# Patient Record
Sex: MALE | Race: BLACK OR AFRICAN AMERICAN | Employment: UNEMPLOYED | URBAN - METROPOLITAN AREA
[De-identification: names, ages, dates, MRNs, and addresses within clinical notes are randomized per-mention and may not be internally consistent; named-entity substitution may affect disease eponyms.]

---

## 2017-03-18 ENCOUNTER — HOSPITAL ENCOUNTER (EMERGENCY)
Facility: CLINIC | Age: 52
Discharge: HOME | End: 2017-03-18
Attending: EMERGENCY MEDICINE
Payer: COMMERCIAL

## 2017-03-18 VITALS
OXYGEN SATURATION: 98 % | BODY MASS INDEX: 28.51 KG/M2 | HEIGHT: 69 IN | HEART RATE: 83 BPM | SYSTOLIC BLOOD PRESSURE: 156 MMHG | TEMPERATURE: 98.1 F | WEIGHT: 192.5 LBS | DIASTOLIC BLOOD PRESSURE: 99 MMHG | RESPIRATION RATE: 16 BRPM

## 2017-03-18 DIAGNOSIS — J06.9 UPPER RESPIRATORY TRACT INFECTION, UNSPECIFIED TYPE: Primary | ICD-10-CM

## 2017-03-18 PROCEDURE — 99282 EMERGENCY DEPT VISIT SF MDM: CPT

## 2017-03-18 RX ORDER — BENZONATATE 100 MG/1
100 CAPSULE ORAL 3 TIMES DAILY PRN
Qty: 30 CAPSULE | Refills: 0 | Status: SHIPPED | OUTPATIENT
Start: 2017-03-18 | End: 2017-03-25

## 2017-08-21 ENCOUNTER — APPOINTMENT (OUTPATIENT)
Dept: GENERAL RADIOLOGY | Age: 52
End: 2017-08-21
Payer: COMMERCIAL

## 2017-08-21 ENCOUNTER — HOSPITAL ENCOUNTER (EMERGENCY)
Age: 52
Discharge: HOME | End: 2017-08-21
Attending: EMERGENCY MEDICINE
Payer: COMMERCIAL

## 2017-08-21 VITALS
DIASTOLIC BLOOD PRESSURE: 101 MMHG | TEMPERATURE: 98.6 F | SYSTOLIC BLOOD PRESSURE: 179 MMHG | RESPIRATION RATE: 18 BRPM | HEIGHT: 69 IN | WEIGHT: 202 LBS | OXYGEN SATURATION: 98 % | BODY MASS INDEX: 29.92 KG/M2 | HEART RATE: 79 BPM

## 2017-08-21 DIAGNOSIS — M54.9 MUSCULOSKELETAL BACK PAIN: ICD-10-CM

## 2017-08-21 DIAGNOSIS — I10 ESSENTIAL HYPERTENSION: Primary | ICD-10-CM

## 2017-08-21 LAB
ABSOLUTE EOS #: 0 K/UL (ref 0–0.4)
ABSOLUTE LYMPH #: 2.06 K/UL (ref 1–4.8)
ABSOLUTE MONO #: 0.26 K/UL (ref 0.1–0.8)
ANION GAP SERPL CALCULATED.3IONS-SCNC: 15 MMOL/L (ref 9–17)
BASOPHILS # BLD: 0 %
BASOPHILS ABSOLUTE: 0 K/UL (ref 0–0.2)
BUN BLDV-MCNC: 17 MG/DL (ref 6–20)
BUN/CREAT BLD: NORMAL (ref 9–20)
CALCIUM SERPL-MCNC: 8.8 MG/DL (ref 8.6–10.4)
CHLORIDE BLD-SCNC: 100 MMOL/L (ref 98–107)
CO2: 21 MMOL/L (ref 20–31)
CREAT SERPL-MCNC: 0.94 MG/DL (ref 0.7–1.2)
D-DIMER QUANTITATIVE: 0.24 MG/L FEU
DIFFERENTIAL TYPE: ABNORMAL
EOSINOPHILS RELATIVE PERCENT: 0 %
GFR AFRICAN AMERICAN: >60 ML/MIN
GFR NON-AFRICAN AMERICAN: >60 ML/MIN
GFR SERPL CREATININE-BSD FRML MDRD: NORMAL ML/MIN/{1.73_M2}
GFR SERPL CREATININE-BSD FRML MDRD: NORMAL ML/MIN/{1.73_M2}
GLUCOSE BLD-MCNC: 98 MG/DL (ref 70–99)
HCT VFR BLD CALC: 41.4 % (ref 41–53)
HEMOGLOBIN: 13.6 G/DL (ref 13.5–17.5)
LYMPHOCYTES # BLD: 24 %
MCH RBC QN AUTO: 27 PG (ref 26–34)
MCHC RBC AUTO-ENTMCNC: 32.8 G/DL (ref 31–37)
MCV RBC AUTO: 82.2 FL (ref 80–100)
MONOCYTES # BLD: 3 %
MORPHOLOGY: ABNORMAL
PDW BLD-RTO: 17.7 % (ref 12.5–15.4)
PLATELET # BLD: 230 K/UL (ref 140–450)
PLATELET ESTIMATE: ABNORMAL
PMV BLD AUTO: 12 FL (ref 6–12)
POC TROPONIN I: 0.01 NG/ML (ref 0–0.1)
POC TROPONIN INTERP: NORMAL
POTASSIUM SERPL-SCNC: 4 MMOL/L (ref 3.7–5.3)
RBC # BLD: 5.03 M/UL (ref 4.5–5.9)
RBC # BLD: ABNORMAL 10*6/UL
SEG NEUTROPHILS: 73 %
SEGMENTED NEUTROPHILS ABSOLUTE COUNT: 6.28 K/UL (ref 1.8–7.7)
SODIUM BLD-SCNC: 136 MMOL/L (ref 135–144)
TROPONIN INTERP: NORMAL
TROPONIN T: <0.03 NG/ML
WBC # BLD: 8.6 K/UL (ref 3.5–11)
WBC # BLD: ABNORMAL 10*3/UL

## 2017-08-21 PROCEDURE — 84484 ASSAY OF TROPONIN QUANT: CPT

## 2017-08-21 PROCEDURE — 6370000000 HC RX 637 (ALT 250 FOR IP): Performed by: STUDENT IN AN ORGANIZED HEALTH CARE EDUCATION/TRAINING PROGRAM

## 2017-08-21 PROCEDURE — 85025 COMPLETE CBC W/AUTO DIFF WBC: CPT

## 2017-08-21 PROCEDURE — 71020 XR CHEST STANDARD TWO VW: CPT

## 2017-08-21 PROCEDURE — 99284 EMERGENCY DEPT VISIT MOD MDM: CPT

## 2017-08-21 PROCEDURE — 80048 BASIC METABOLIC PNL TOTAL CA: CPT

## 2017-08-21 PROCEDURE — 72100 X-RAY EXAM L-S SPINE 2/3 VWS: CPT

## 2017-08-21 PROCEDURE — 85379 FIBRIN DEGRADATION QUANT: CPT

## 2017-08-21 PROCEDURE — 93005 ELECTROCARDIOGRAM TRACING: CPT

## 2017-08-21 RX ORDER — IBUPROFEN 400 MG/1
400 TABLET ORAL ONCE
Status: COMPLETED | OUTPATIENT
Start: 2017-08-21 | End: 2017-08-21

## 2017-08-21 RX ORDER — CYCLOBENZAPRINE HCL 10 MG
10 TABLET ORAL 3 TIMES DAILY PRN
Qty: 15 TABLET | Refills: 0 | Status: SHIPPED | OUTPATIENT
Start: 2017-08-21 | End: 2017-08-31

## 2017-08-21 RX ORDER — ACETAMINOPHEN 325 MG/1
650 TABLET ORAL EVERY 6 HOURS PRN
Qty: 30 TABLET | Refills: 0 | Status: SHIPPED | OUTPATIENT
Start: 2017-08-21 | End: 2018-09-02

## 2017-08-21 RX ORDER — HYDROCHLOROTHIAZIDE 12.5 MG/1
12.5 CAPSULE, GELATIN COATED ORAL DAILY
Qty: 30 CAPSULE | Refills: 3 | Status: SHIPPED | OUTPATIENT
Start: 2017-08-21 | End: 2019-01-06

## 2017-08-21 RX ADMIN — IBUPROFEN 400 MG: 400 TABLET ORAL at 20:46

## 2017-08-21 ASSESSMENT — ENCOUNTER SYMPTOMS
PHOTOPHOBIA: 0
NAUSEA: 0
COUGH: 0
VOMITING: 0
ABDOMINAL DISTENTION: 0
CHEST TIGHTNESS: 0
SHORTNESS OF BREATH: 0
ABDOMINAL PAIN: 0
EYE REDNESS: 0
BACK PAIN: 1

## 2017-08-21 ASSESSMENT — PAIN DESCRIPTION - DESCRIPTORS: DESCRIPTORS: SHARP;SHOOTING

## 2017-08-21 ASSESSMENT — PAIN DESCRIPTION - PAIN TYPE: TYPE: ACUTE PAIN

## 2017-08-21 ASSESSMENT — PAIN SCALES - GENERAL: PAINLEVEL_OUTOF10: 7

## 2017-08-21 ASSESSMENT — PAIN DESCRIPTION - ORIENTATION: ORIENTATION: LOWER

## 2017-08-21 ASSESSMENT — PAIN DESCRIPTION - LOCATION: LOCATION: BACK

## 2017-08-23 LAB
EKG ATRIAL RATE: 72 BPM
EKG P AXIS: 54 DEGREES
EKG P-R INTERVAL: 200 MS
EKG Q-T INTERVAL: 394 MS
EKG QRS DURATION: 100 MS
EKG QTC CALCULATION (BAZETT): 431 MS
EKG R AXIS: -3 DEGREES
EKG T AXIS: 30 DEGREES
EKG VENTRICULAR RATE: 72 BPM

## 2018-01-18 ENCOUNTER — HOSPITAL ENCOUNTER (EMERGENCY)
Facility: CLINIC | Age: 53
Discharge: HOME | End: 2018-01-18
Attending: EMERGENCY MEDICINE
Payer: COMMERCIAL

## 2018-01-18 ENCOUNTER — APPOINTMENT (OUTPATIENT)
Dept: GENERAL RADIOLOGY | Facility: CLINIC | Age: 53
End: 2018-01-18
Payer: COMMERCIAL

## 2018-01-18 VITALS
BODY MASS INDEX: 30.51 KG/M2 | SYSTOLIC BLOOD PRESSURE: 173 MMHG | WEIGHT: 206 LBS | HEIGHT: 69 IN | DIASTOLIC BLOOD PRESSURE: 101 MMHG | RESPIRATION RATE: 15 BRPM | OXYGEN SATURATION: 99 % | TEMPERATURE: 99.6 F | HEART RATE: 70 BPM

## 2018-01-18 DIAGNOSIS — J10.1 INFLUENZA A: Primary | ICD-10-CM

## 2018-01-18 LAB
DIRECT EXAM: ABNORMAL
Lab: ABNORMAL
SPECIMEN DESCRIPTION: ABNORMAL
STATUS: ABNORMAL

## 2018-01-18 PROCEDURE — 99283 EMERGENCY DEPT VISIT LOW MDM: CPT

## 2018-01-18 PROCEDURE — 87804 INFLUENZA ASSAY W/OPTIC: CPT

## 2018-01-18 PROCEDURE — 71046 X-RAY EXAM CHEST 2 VIEWS: CPT

## 2018-01-18 RX ORDER — ALBUTEROL SULFATE 90 UG/1
2 AEROSOL, METERED RESPIRATORY (INHALATION) EVERY 4 HOURS PRN
Qty: 1 INHALER | Refills: 0 | OUTPATIENT
Start: 2018-01-18 | End: 2019-01-06

## 2018-01-18 RX ORDER — BENZONATATE 100 MG/1
100 CAPSULE ORAL 3 TIMES DAILY PRN
Qty: 30 CAPSULE | Refills: 0 | Status: SHIPPED | OUTPATIENT
Start: 2018-01-18 | End: 2018-01-25

## 2018-01-18 RX ORDER — ALBUTEROL SULFATE 90 UG/1
2 AEROSOL, METERED RESPIRATORY (INHALATION) EVERY 4 HOURS PRN
Qty: 1 INHALER | Refills: 0 | Status: SHIPPED | OUTPATIENT
Start: 2018-01-18 | End: 2019-04-16 | Stop reason: ALTCHOICE

## 2018-01-18 RX ORDER — BENZONATATE 100 MG/1
100 CAPSULE ORAL 3 TIMES DAILY PRN
Qty: 30 CAPSULE | Refills: 0 | OUTPATIENT
Start: 2018-01-18 | End: 2018-01-18

## 2018-01-18 ASSESSMENT — ENCOUNTER SYMPTOMS
SINUS PRESSURE: 0
ABDOMINAL PAIN: 0
VOMITING: 0
COUGH: 1
RHINORRHEA: 1
SHORTNESS OF BREATH: 0
DIARRHEA: 0
EYES NEGATIVE: 1
SORE THROAT: 0
NAUSEA: 0

## 2018-01-19 NOTE — ED PROVIDER NOTES
Encounter   Medications    DISCONTD: benzonatate (TESSALON PERLES) 100 MG capsule     Sig: Take 1 capsule by mouth 3 times daily as needed for Cough     Dispense:  30 capsule     Refill:  0    albuterol sulfate HFA (PROVENTIL HFA) 108 (90 Base) MCG/ACT inhaler     Sig: Inhale 2 puffs into the lungs every 4 hours as needed for Wheezing or Shortness of Breath (Space out to every 6 hours as symptoms improve) Space out to every 6 hours as symptoms improve. Dispense:  1 Inhaler     Refill:  0    benzonatate (TESSALON PERLES) 100 MG capsule     Sig: Take 1 capsule by mouth 3 times daily as needed for Cough     Dispense:  30 capsule     Refill:  0    albuterol sulfate HFA (PROVENTIL HFA) 108 (90 Base) MCG/ACT inhaler     Sig: Inhale 2 puffs into the lungs every 4 hours as needed for Wheezing or Shortness of Breath (Space out to every 6 hours as symptoms improve) Space out to every 6 hours as symptoms improve. Dispense:  1 Inhaler     Refill:  0        Vitals:    Vitals:    01/18/18 1923 01/18/18 1925   BP:  (!) 181/113   Pulse: 73    Resp: 16    Temp: 99.6 °F (37.6 °C)    TempSrc: Temporal    SpO2: 99%    Weight: 93.4 kg (206 lb)    Height: 5' 9\" (1.753 m)      -------------------------  BP: (!) 181/113, Temp: 99.6 °F (37.6 °C), Pulse: 73, Resp: 16      Re-evaluation Notes    8:19 PM:   Patient is doing well on reevaluation. No acute changes. Influenza is positive. Chest x-ray negative for acute significant findings. Clinically he is nontoxic-appearing. I did recheck the blood pressure in the room and it is slightly lower but still elevated. Since he is otherwise asymptomatic I do not feel further workup in the emergency department is necessary for his hypertension that is otherwise a symptomatically. I feel his influenza may be causing it to be elevated somewhat as well. He is outside of the Tamiflu window.   I strongly advised and encouraged the patient to start taking his blood pressure medication

## 2018-09-02 ENCOUNTER — APPOINTMENT (OUTPATIENT)
Dept: GENERAL RADIOLOGY | Age: 53
End: 2018-09-02
Payer: COMMERCIAL

## 2018-09-02 ENCOUNTER — HOSPITAL ENCOUNTER (EMERGENCY)
Age: 53
Discharge: HOME | End: 2018-09-02
Attending: EMERGENCY MEDICINE
Payer: COMMERCIAL

## 2018-09-02 VITALS
SYSTOLIC BLOOD PRESSURE: 138 MMHG | WEIGHT: 200 LBS | DIASTOLIC BLOOD PRESSURE: 84 MMHG | OXYGEN SATURATION: 98 % | HEART RATE: 92 BPM | BODY MASS INDEX: 29.53 KG/M2 | TEMPERATURE: 97.7 F | RESPIRATION RATE: 18 BRPM

## 2018-09-02 DIAGNOSIS — M79.674 GREAT TOE PAIN, RIGHT: Primary | ICD-10-CM

## 2018-09-02 PROCEDURE — 99283 EMERGENCY DEPT VISIT LOW MDM: CPT

## 2018-09-02 PROCEDURE — 6370000000 HC RX 637 (ALT 250 FOR IP): Performed by: EMERGENCY MEDICINE

## 2018-09-02 PROCEDURE — 73630 X-RAY EXAM OF FOOT: CPT

## 2018-09-02 RX ORDER — ACETAMINOPHEN 325 MG/1
650 TABLET ORAL EVERY 8 HOURS PRN
Qty: 30 TABLET | Refills: 0 | Status: SHIPPED | OUTPATIENT
Start: 2018-09-02 | End: 2019-01-06

## 2018-09-02 RX ORDER — ACETAMINOPHEN 325 MG/1
650 TABLET ORAL ONCE
Status: COMPLETED | OUTPATIENT
Start: 2018-09-02 | End: 2018-09-02

## 2018-09-02 RX ORDER — IBUPROFEN 800 MG/1
800 TABLET ORAL ONCE
Status: COMPLETED | OUTPATIENT
Start: 2018-09-02 | End: 2018-09-02

## 2018-09-02 RX ORDER — IBUPROFEN 800 MG/1
800 TABLET ORAL EVERY 8 HOURS PRN
Qty: 30 TABLET | Refills: 0 | Status: SHIPPED | OUTPATIENT
Start: 2018-09-02 | End: 2019-01-06

## 2018-09-02 RX ADMIN — IBUPROFEN 800 MG: 800 TABLET ORAL at 21:59

## 2018-09-02 RX ADMIN — ACETAMINOPHEN 650 MG: 325 TABLET ORAL at 21:59

## 2018-09-02 ASSESSMENT — PAIN DESCRIPTION - LOCATION: LOCATION: TOE (COMMENT WHICH ONE)

## 2018-09-02 ASSESSMENT — PAIN SCALES - GENERAL
PAINLEVEL_OUTOF10: 10

## 2018-09-02 ASSESSMENT — PAIN DESCRIPTION - ORIENTATION: ORIENTATION: RIGHT

## 2018-09-03 NOTE — ED PROVIDER NOTES
[facility] [facility]  Emergency Department Encounter  Emergency Medicine Resident Note     Pt Name: Priscilla Garzon  MRN: [id number]  [place] 1965  Date of evaluation: 9/2/2018  PCP:  No primary care provider on file. CHIEF COMPLAINT       Chief Complaint   Patient presents with    Toe Pain       HISTORY OF PRESENT ILLNESS  (Location/Symptom, Timing/Onset, Context/Setting, Quality, Duration, Modifying Factors, Severity.)      Priscilla Garzon is a 52 y.o. male who presents with Right great toe pain. He states that he stepped out of the car and stepped weird. He states that he kind of rolled his ankle, but he does not have any ankle pain and instead he has toe pain feels like he may have stubbed it. Reports 8 out of 10 pain. States his family over the right toe. He has not taken anything for this as this happened right prior to arrival.  No numbness or weakness of the toe. PAST MEDICAL / SURGICAL / SOCIAL / FAMILY HISTORY     Past Medical History:  has a past medical history of Hypertension. Past Surgical History:  has a past surgical history that includes Ankle surgery. Allergies:  Patient has no known allergies. Home Meds:   Prior to Visit Medications    Medication Sig Taking? Authorizing Provider   ibuprofen (ADVIL;MOTRIN) 800 MG tablet Take 1 tablet by mouth every 8 hours as needed for Pain Yes Denis Draper MD   acetaminophen (TYLENOL) 325 MG tablet Take 2 tablets by mouth every 8 hours as needed for Pain or Fever Yes Denis Draper MD   albuterol sulfate HFA (PROVENTIL HFA) 108 (90 Base) MCG/ACT inhaler Inhale 2 puffs into the lungs every 4 hours as needed for Wheezing or Shortness of Breath (Space out to every 6 hours as symptoms improve) Space out to every 6 hours as symptoms improve.   Rick Monteiro,    albuterol sulfate HFA (PROVENTIL HFA) 108 (90 Base) MCG/ACT inhaler Inhale 2 puffs into the lungs every 4 hours as needed for Wheezing or Shortness of Breath

## 2019-01-06 ENCOUNTER — HOSPITAL ENCOUNTER (EMERGENCY)
Age: 54
Discharge: HOME | End: 2019-01-06
Attending: EMERGENCY MEDICINE
Payer: COMMERCIAL

## 2019-01-06 VITALS
HEIGHT: 69 IN | WEIGHT: 200 LBS | BODY MASS INDEX: 29.62 KG/M2 | TEMPERATURE: 98.2 F | SYSTOLIC BLOOD PRESSURE: 176 MMHG | HEART RATE: 65 BPM | DIASTOLIC BLOOD PRESSURE: 110 MMHG | RESPIRATION RATE: 16 BRPM | OXYGEN SATURATION: 100 %

## 2019-01-06 DIAGNOSIS — I10 ESSENTIAL HYPERTENSION: ICD-10-CM

## 2019-01-06 DIAGNOSIS — I15.9 SECONDARY HYPERTENSION: Primary | ICD-10-CM

## 2019-01-06 PROCEDURE — 6370000000 HC RX 637 (ALT 250 FOR IP): Performed by: PHYSICIAN ASSISTANT

## 2019-01-06 PROCEDURE — 99283 EMERGENCY DEPT VISIT LOW MDM: CPT

## 2019-01-06 RX ORDER — HYDROCHLOROTHIAZIDE 12.5 MG/1
12.5 CAPSULE, GELATIN COATED ORAL DAILY
Qty: 30 CAPSULE | Refills: 3 | Status: SHIPPED | OUTPATIENT
Start: 2019-01-06 | End: 2019-01-06

## 2019-01-06 RX ORDER — HYDROCHLOROTHIAZIDE 25 MG/1
12.5 TABLET ORAL ONCE
Status: COMPLETED | OUTPATIENT
Start: 2019-01-06 | End: 2019-01-06

## 2019-01-06 RX ORDER — HYDROCHLOROTHIAZIDE 12.5 MG/1
12.5 CAPSULE, GELATIN COATED ORAL DAILY
Qty: 30 CAPSULE | Refills: 0 | Status: SHIPPED | OUTPATIENT
Start: 2019-01-06 | End: 2021-04-22

## 2019-01-06 RX ADMIN — HYDROCHLOROTHIAZIDE 12.5 MG: 25 TABLET ORAL at 16:49

## 2019-01-06 ASSESSMENT — ENCOUNTER SYMPTOMS
COLOR CHANGE: 0
ABDOMINAL PAIN: 0
VOMITING: 0
BACK PAIN: 0
COUGH: 0
SORE THROAT: 0
DIARRHEA: 0
NAUSEA: 0
SHORTNESS OF BREATH: 0

## 2019-03-07 ENCOUNTER — OFFICE VISIT (OUTPATIENT)
Dept: PRIMARY CARE CLINIC | Age: 54
End: 2019-03-07
Payer: COMMERCIAL

## 2019-03-07 ENCOUNTER — HOSPITAL ENCOUNTER (OUTPATIENT)
Age: 54
Discharge: HOME | End: 2019-03-07
Payer: COMMERCIAL

## 2019-03-07 VITALS
HEART RATE: 80 BPM | DIASTOLIC BLOOD PRESSURE: 101 MMHG | TEMPERATURE: 97.9 F | OXYGEN SATURATION: 98 % | SYSTOLIC BLOOD PRESSURE: 171 MMHG | BODY MASS INDEX: 27.35 KG/M2 | WEIGHT: 185.2 LBS

## 2019-03-07 DIAGNOSIS — K64.9 HEMORRHOIDS, UNSPECIFIED HEMORRHOID TYPE: Primary | ICD-10-CM

## 2019-03-07 LAB
HCT VFR BLD CALC: 39.7 % (ref 40.7–50.3)
HEMOGLOBIN: 13.3 G/DL (ref 13–17)
MCH RBC QN AUTO: 27.8 PG (ref 25.2–33.5)
MCHC RBC AUTO-ENTMCNC: 33.5 G/DL (ref 28.4–34.8)
MCV RBC AUTO: 82.9 FL (ref 82.6–102.9)
NRBC AUTOMATED: 0 PER 100 WBC
PDW BLD-RTO: 16.4 % (ref 11.8–14.4)
PLATELET # BLD: ABNORMAL K/UL (ref 138–453)
PLATELET, FLUORESCENCE: 187 K/UL (ref 138–453)
PLATELET, IMMATURE FRACTION: 12.6 % (ref 1.1–10.3)
PMV BLD AUTO: ABNORMAL FL (ref 8.1–13.5)
RBC # BLD: 4.79 M/UL (ref 4.21–5.77)
WBC # BLD: 7.7 K/UL (ref 3.5–11.3)

## 2019-03-07 PROCEDURE — 85055 RETICULATED PLATELET ASSAY: CPT

## 2019-03-07 PROCEDURE — 99202 OFFICE O/P NEW SF 15 MIN: CPT | Performed by: INTERNAL MEDICINE

## 2019-03-07 PROCEDURE — 85027 COMPLETE CBC AUTOMATED: CPT

## 2019-03-07 PROCEDURE — 36415 COLL VENOUS BLD VENIPUNCTURE: CPT

## 2019-03-07 ASSESSMENT — PATIENT HEALTH QUESTIONNAIRE - PHQ9
1. LITTLE INTEREST OR PLEASURE IN DOING THINGS: 0
2. FEELING DOWN, DEPRESSED OR HOPELESS: 0
SUM OF ALL RESPONSES TO PHQ QUESTIONS 1-9: 0
SUM OF ALL RESPONSES TO PHQ QUESTIONS 1-9: 0
SUM OF ALL RESPONSES TO PHQ9 QUESTIONS 1 & 2: 0

## 2019-03-07 ASSESSMENT — ENCOUNTER SYMPTOMS: HEMATOCHEZIA: 1

## 2019-04-16 ENCOUNTER — OFFICE VISIT (OUTPATIENT)
Dept: INTERNAL MEDICINE | Age: 54
End: 2019-04-16
Payer: COMMERCIAL

## 2019-04-16 VITALS
HEART RATE: 80 BPM | WEIGHT: 188.6 LBS | DIASTOLIC BLOOD PRESSURE: 95 MMHG | HEIGHT: 69 IN | TEMPERATURE: 98.6 F | SYSTOLIC BLOOD PRESSURE: 160 MMHG | BODY MASS INDEX: 27.93 KG/M2

## 2019-04-16 DIAGNOSIS — Z13.220 SCREENING FOR LIPID DISORDERS: ICD-10-CM

## 2019-04-16 DIAGNOSIS — K64.4 BLEEDING EXTERNAL HEMORRHOIDS: Primary | ICD-10-CM

## 2019-04-16 DIAGNOSIS — R73.9 HYPERGLYCEMIA: ICD-10-CM

## 2019-04-16 DIAGNOSIS — I10 ESSENTIAL HYPERTENSION: ICD-10-CM

## 2019-04-16 DIAGNOSIS — Z12.11 COLON CANCER SCREENING: ICD-10-CM

## 2019-04-16 PROCEDURE — 99211 OFF/OP EST MAY X REQ PHY/QHP: CPT | Performed by: INTERNAL MEDICINE

## 2019-04-16 PROCEDURE — 99203 OFFICE O/P NEW LOW 30 MIN: CPT | Performed by: INTERNAL MEDICINE

## 2019-04-16 RX ORDER — LISINOPRIL AND HYDROCHLOROTHIAZIDE 12.5; 1 MG/1; MG/1
1 TABLET ORAL DAILY
Qty: 30 TABLET | Refills: 0 | Status: SHIPPED | OUTPATIENT
Start: 2019-04-16 | End: 2019-05-18 | Stop reason: SDUPTHER

## 2019-04-16 RX ORDER — HYDROCHLOROTHIAZIDE 12.5 MG/1
12.5 CAPSULE, GELATIN COATED ORAL DAILY
Qty: 30 CAPSULE | Refills: 0 | Status: CANCELLED | OUTPATIENT
Start: 2019-04-16

## 2019-04-16 NOTE — PATIENT INSTRUCTIONS
Return To Clinic 5/16/2019 . After Visit Summary  given and reviewed. It is very important for your care that you keep your appointment. If for some reason you are unable to keep your appointment it is equally important that you call our office at [phone] to cancel your appointment and reschedule. Failure to do so may result in your termination from our practice.     -Bloodwork orders given to patient, they will have them done before their next visit. --Dg Gr

## 2019-04-16 NOTE — PROGRESS NOTES
Visit   Medication Sig Dispense Refill    hydrocortisone (ANUSOL-HC) 2.5 % rectal cream Place rectally 2 times daily. 1 Tube 1    hydrochlorothiazide (MICROZIDE) 12.5 MG capsule Take 1 capsule by mouth daily 30 capsule 0    albuterol sulfate HFA (PROVENTIL HFA) 108 (90 Base) MCG/ACT inhaler Inhale 2 puffs into the lungs every 4 hours as needed for Wheezing or Shortness of Breath (Space out to every 6 hours as symptoms improve) Space out to every 6 hours as symptoms improve. 1 Inhaler 0     No current facility-administered medications on file prior to visit. FAMILY HISTORY:          Problem Relation Age of Onset    Other Mother         seizures    High Blood Pressure Mother     High Blood Pressure Father     High Blood Pressure Sister     High Blood Pressure Brother        REVIEW OF SYSTEMS:    Review of Systems   Constitutional: Negative for chills, fatigue and fever. HENT: Negative for congestion, hearing loss, rhinorrhea and sinus pain. Respiratory: Negative for cough, shortness of breath and wheezing. Cardiovascular: Negative for chest pain, palpitations and leg swelling. Gastrointestinal: Negative for abdominal pain, anal bleeding (resolved), blood in stool, constipation, diarrhea and nausea. Endocrine: Negative for polydipsia and polyuria. Genitourinary: Negative for dysuria, frequency and hematuria. Musculoskeletal: Positive for arthralgias and neck pain. Negative for back pain. Allergic/Immunologic: Negative for environmental allergies and immunocompromised state. Neurological: Negative for dizziness, seizures, syncope, weakness and headaches. Hematological: Negative for adenopathy. Does not bruise/bleed easily. Psychiatric/Behavioral: Negative for dysphoric mood. The patient is not nervous/anxious.           PHYSICAL EXAM:      Vitals:    04/16/19 1444 04/16/19 1459   BP: (!) 190/103 (!) 160/95   Site: Left Upper Arm Left Upper Arm   Position: Sitting Sitting   Cuff Size: Medium Adult Medium Adult   Pulse: 80    Temp: 98.6 °F (37 °C)    TempSrc: Oral    Weight: 188 lb 9.6 oz (85.5 kg)    Height: 5' 9.02\" (1.753 m)      Wt Readings from Last 3 Encounters:   04/16/19 188 lb 9.6 oz (85.5 kg)   03/07/19 185 lb 3.2 oz (84 kg)   01/06/19 200 lb (90.7 kg)     Body mass index is 27.84 kg/m². Physical Exam   Constitutional: He is oriented to person, place, and time. He appears well-developed and well-nourished. No distress. HENT:   Head: Normocephalic and atraumatic. Mouth/Throat: Oropharynx is clear and moist.   Eyes: Pupils are equal, round, and reactive to light. Conjunctivae and EOM are normal.   Neck: Normal range of motion. Neck supple. No thyromegaly present. Cardiovascular: Normal rate and regular rhythm. No murmur heard. Pulmonary/Chest: Effort normal and breath sounds normal. He has no wheezes. He has no rales. Abdominal: Soft. Bowel sounds are normal. There is no tenderness. Musculoskeletal: He exhibits no edema. Arms:  Lymphadenopathy:     He has no cervical adenopathy. Neurological: He is alert and oriented to person, place, and time. Skin: Skin is warm and dry. He is not diaphoretic. Nursing note and vitals reviewed. LABORATORY FINDINGS:    CBC:   Lab Results   Component Value Date    WBC 7.7 03/07/2019    HGB 13.3 03/07/2019    PLT See Reflexed IPF Result 03/07/2019     BMP:    Lab Results   Component Value Date     08/21/2017    K 4.0 08/21/2017     08/21/2017    CO2 21 08/21/2017    BUN 17 08/21/2017    CREATININE 0.94 08/21/2017    GLUCOSE 98 08/21/2017     Hemoglobin A1C: No results found for: LABA1C  Lipid profile: No results found for: CHOL, TRIG, HDL  No results found for: LDLCALC, LDLCHOLESTEROL, LDLDIRECT    Thyroid functions: No results found for: TSH   Hepatic functions: No results found for: ALT, AST, PROT, BILITOT, BILIDIR, LABALBU  ASSESSMENT AND PLAN:   1.  Essential hypertension  Discussed DASH diet  Start Lisinopril/HCTZ  Check BP op if possible  Side effects explained  Get labs done    - Basic Metabolic Panel; Future  - Hepatic Function Panel; Future  - TSH with Reflex; Future  - Urinalysis with Microscopic; Future  - lisinopril-hydrochlorothiazide (PRINZIDE;ZESTORETIC) 10-12.5 MG per tablet; Take 1 tablet by mouth daily  Dispense: 30 tablet; Refill: 0    2. Bleeding external hemorrhoids  Asymptomatic now  Avoid constipation  Fiber    - CBC With Auto Differential; Future    3. Colon cancer screening    - Hannah Monroe MD, Gastroenterology, [facility]    4. Screening for lipid disorders    - Lipid, Fasting; Future    5. Hyperglycemia    - Hemoglobin A1C; Future      INSTRUCTIONS:   Return in about 1 month (around 5/14/2019). 1. Yo lerma on the following healthy behaviors: nutrition, exercise, medication adherence and tobacco cessation    2. Reviewed prior labs and healthmaintenance. 3. Discussed use, benefit, and side effects of prescribed medications. Barriers tomedication compliance addressed. All patient questions answered. Pt voiced understanding.      4. Patient giveneducational materials - see patient instructions    Love Preston MD    4/16/2019, 3:03 PM

## 2019-04-17 ASSESSMENT — ENCOUNTER SYMPTOMS
DIARRHEA: 0
CONSTIPATION: 0
BACK PAIN: 0
WHEEZING: 0
RHINORRHEA: 0
SHORTNESS OF BREATH: 0
ANAL BLEEDING: 0
ABDOMINAL PAIN: 0
SINUS PAIN: 0
COUGH: 0
BLOOD IN STOOL: 0
NAUSEA: 0

## 2019-05-16 DIAGNOSIS — I10 ESSENTIAL HYPERTENSION: ICD-10-CM

## 2019-05-18 RX ORDER — LISINOPRIL AND HYDROCHLOROTHIAZIDE 12.5; 1 MG/1; MG/1
1 TABLET ORAL DAILY
Qty: 30 TABLET | Refills: 0 | Status: SHIPPED | OUTPATIENT
Start: 2019-05-18 | End: 2021-04-22 | Stop reason: SDUPTHER

## 2019-06-19 ENCOUNTER — TELEPHONE (OUTPATIENT)
Dept: GASTROENTEROLOGY | Age: 54
End: 2019-06-19

## 2019-07-26 ENCOUNTER — TELEPHONE (OUTPATIENT)
Dept: INTERNAL MEDICINE | Age: 54
End: 2019-07-26

## 2019-11-04 ENCOUNTER — TELEPHONE (OUTPATIENT)
Dept: INTERNAL MEDICINE | Age: 54
End: 2019-11-04

## 2019-12-03 ENCOUNTER — APPOINTMENT (OUTPATIENT)
Dept: GENERAL RADIOLOGY | Age: 54
End: 2019-12-03
Payer: COMMERCIAL

## 2019-12-03 ENCOUNTER — HOSPITAL ENCOUNTER (EMERGENCY)
Age: 54
Discharge: LEFT BEFORE DISCHARGE | End: 2019-12-03
Attending: EMERGENCY MEDICINE
Payer: COMMERCIAL

## 2019-12-03 VITALS
HEART RATE: 61 BPM | TEMPERATURE: 97.9 F | DIASTOLIC BLOOD PRESSURE: 91 MMHG | OXYGEN SATURATION: 100 % | BODY MASS INDEX: 27.92 KG/M2 | SYSTOLIC BLOOD PRESSURE: 156 MMHG | RESPIRATION RATE: 13 BRPM | WEIGHT: 195 LBS | HEIGHT: 70 IN

## 2019-12-03 DIAGNOSIS — Z53.29 LEFT AGAINST MEDICAL ADVICE: ICD-10-CM

## 2019-12-03 DIAGNOSIS — R07.9 CHEST PAIN, UNSPECIFIED TYPE: Primary | ICD-10-CM

## 2019-12-03 LAB
ABSOLUTE EOS #: 0.14 K/UL (ref 0–0.44)
ABSOLUTE IMMATURE GRANULOCYTE: 0.03 K/UL (ref 0–0.3)
ABSOLUTE LYMPH #: 2.7 K/UL (ref 1.1–3.7)
ABSOLUTE MONO #: 0.71 K/UL (ref 0.1–1.2)
ANION GAP SERPL CALCULATED.3IONS-SCNC: 18 MMOL/L (ref 9–17)
BASOPHILS # BLD: 1 % (ref 0–2)
BASOPHILS ABSOLUTE: 0.04 K/UL (ref 0–0.2)
BUN BLDV-MCNC: 15 MG/DL (ref 6–20)
BUN/CREAT BLD: ABNORMAL (ref 9–20)
CALCIUM SERPL-MCNC: 9 MG/DL (ref 8.6–10.4)
CHLORIDE BLD-SCNC: 106 MMOL/L (ref 98–107)
CO2: 17 MMOL/L (ref 20–31)
CREAT SERPL-MCNC: 0.88 MG/DL (ref 0.7–1.2)
DIFFERENTIAL TYPE: ABNORMAL
EOSINOPHILS RELATIVE PERCENT: 2 % (ref 1–4)
GFR AFRICAN AMERICAN: >60 ML/MIN
GFR NON-AFRICAN AMERICAN: >60 ML/MIN
GFR SERPL CREATININE-BSD FRML MDRD: ABNORMAL ML/MIN/{1.73_M2}
GFR SERPL CREATININE-BSD FRML MDRD: ABNORMAL ML/MIN/{1.73_M2}
GLUCOSE BLD-MCNC: 93 MG/DL (ref 70–99)
HCT VFR BLD CALC: 38.5 % (ref 40.7–50.3)
HEMOGLOBIN: 12.9 G/DL (ref 13–17)
IMMATURE GRANULOCYTES: 0 %
LYMPHOCYTES # BLD: 30 % (ref 24–43)
MCH RBC QN AUTO: 28.1 PG (ref 25.2–33.5)
MCHC RBC AUTO-ENTMCNC: 33.5 G/DL (ref 28.4–34.8)
MCV RBC AUTO: 83.9 FL (ref 82.6–102.9)
MONOCYTES # BLD: 8 % (ref 3–12)
NRBC AUTOMATED: 0 PER 100 WBC
PDW BLD-RTO: 16.2 % (ref 11.8–14.4)
PLATELET # BLD: ABNORMAL K/UL (ref 138–453)
PLATELET ESTIMATE: ABNORMAL
PLATELET, FLUORESCENCE: 226 K/UL (ref 138–453)
PLATELET, IMMATURE FRACTION: 8.2 % (ref 1.1–10.3)
PMV BLD AUTO: ABNORMAL FL (ref 8.1–13.5)
POTASSIUM SERPL-SCNC: 3.4 MMOL/L (ref 3.7–5.3)
RBC # BLD: 4.59 M/UL (ref 4.21–5.77)
RBC # BLD: ABNORMAL 10*6/UL
SEG NEUTROPHILS: 59 % (ref 36–65)
SEGMENTED NEUTROPHILS ABSOLUTE COUNT: 5.25 K/UL (ref 1.5–8.1)
SODIUM BLD-SCNC: 141 MMOL/L (ref 135–144)
TROPONIN INTERP: NORMAL
TROPONIN T: NORMAL NG/ML
TROPONIN, HIGH SENSITIVITY: 9 NG/L (ref 0–22)
WBC # BLD: 8.9 K/UL (ref 3.5–11.3)
WBC # BLD: ABNORMAL 10*3/UL

## 2019-12-03 PROCEDURE — 80048 BASIC METABOLIC PNL TOTAL CA: CPT

## 2019-12-03 PROCEDURE — 99285 EMERGENCY DEPT VISIT HI MDM: CPT

## 2019-12-03 PROCEDURE — 85055 RETICULATED PLATELET ASSAY: CPT

## 2019-12-03 PROCEDURE — 85025 COMPLETE CBC W/AUTO DIFF WBC: CPT

## 2019-12-03 PROCEDURE — 93005 ELECTROCARDIOGRAM TRACING: CPT | Performed by: STUDENT IN AN ORGANIZED HEALTH CARE EDUCATION/TRAINING PROGRAM

## 2019-12-03 PROCEDURE — 84484 ASSAY OF TROPONIN QUANT: CPT

## 2019-12-03 RX ORDER — ASPIRIN 81 MG/1
324 TABLET, CHEWABLE ORAL ONCE
Status: DISCONTINUED | OUTPATIENT
Start: 2019-12-03 | End: 2019-12-03 | Stop reason: HOSPADM

## 2019-12-03 ASSESSMENT — PAIN DESCRIPTION - ORIENTATION: ORIENTATION: LEFT

## 2019-12-03 ASSESSMENT — PAIN DESCRIPTION - LOCATION: LOCATION: CHEST

## 2019-12-03 ASSESSMENT — ENCOUNTER SYMPTOMS
SHORTNESS OF BREATH: 0
ABDOMINAL PAIN: 0

## 2019-12-03 ASSESSMENT — PAIN DESCRIPTION - DESCRIPTORS: DESCRIPTORS: TIGHTNESS;PINS AND NEEDLES

## 2019-12-03 ASSESSMENT — PAIN DESCRIPTION - PAIN TYPE: TYPE: ACUTE PAIN

## 2019-12-03 ASSESSMENT — PAIN SCALES - GENERAL: PAINLEVEL_OUTOF10: 8

## 2019-12-03 ASSESSMENT — PAIN DESCRIPTION - PROGRESSION: CLINICAL_PROGRESSION: NOT CHANGED

## 2019-12-03 ASSESSMENT — PAIN DESCRIPTION - FREQUENCY: FREQUENCY: CONTINUOUS

## 2019-12-04 LAB
EKG ATRIAL RATE: 64 BPM
EKG P AXIS: 53 DEGREES
EKG P-R INTERVAL: 192 MS
EKG Q-T INTERVAL: 462 MS
EKG QRS DURATION: 104 MS
EKG QTC CALCULATION (BAZETT): 476 MS
EKG R AXIS: -11 DEGREES
EKG T AXIS: 18 DEGREES
EKG VENTRICULAR RATE: 64 BPM

## 2019-12-04 PROCEDURE — 93010 ELECTROCARDIOGRAM REPORT: CPT | Performed by: INTERNAL MEDICINE

## 2020-01-13 ENCOUNTER — TELEPHONE (OUTPATIENT)
Dept: INTERNAL MEDICINE | Age: 55
End: 2020-01-13

## 2021-04-14 ENCOUNTER — HOSPITAL ENCOUNTER (EMERGENCY)
Age: 56
Discharge: HOME | End: 2021-04-14
Attending: EMERGENCY MEDICINE
Payer: MEDICAID

## 2021-04-14 VITALS
HEIGHT: 70 IN | DIASTOLIC BLOOD PRESSURE: 91 MMHG | HEART RATE: 71 BPM | WEIGHT: 180 LBS | OXYGEN SATURATION: 97 % | SYSTOLIC BLOOD PRESSURE: 163 MMHG | RESPIRATION RATE: 20 BRPM | TEMPERATURE: 98 F | BODY MASS INDEX: 25.77 KG/M2

## 2021-04-14 DIAGNOSIS — M54.31 RIGHT SCIATIC NERVE PAIN: Primary | ICD-10-CM

## 2021-04-14 PROCEDURE — 99283 EMERGENCY DEPT VISIT LOW MDM: CPT

## 2021-04-14 RX ORDER — CYCLOBENZAPRINE HCL 10 MG
10 TABLET ORAL 3 TIMES DAILY PRN
Qty: 10 TABLET | Refills: 0 | Status: SHIPPED | OUTPATIENT
Start: 2021-04-14 | End: 2021-04-22

## 2021-04-14 ASSESSMENT — ENCOUNTER SYMPTOMS
VOMITING: 0
RHINORRHEA: 0
CONSTIPATION: 0
NAUSEA: 0
BACK PAIN: 1
SORE THROAT: 0
DIARRHEA: 0
ABDOMINAL DISTENTION: 0
SHORTNESS OF BREATH: 0
WHEEZING: 0
COUGH: 0

## 2021-04-14 ASSESSMENT — PAIN DESCRIPTION - LOCATION: LOCATION: BUTTOCKS;LEG

## 2021-04-14 ASSESSMENT — PAIN DESCRIPTION - PAIN TYPE: TYPE: ACUTE PAIN

## 2021-04-14 ASSESSMENT — PAIN DESCRIPTION - DESCRIPTORS: DESCRIPTORS: SHOOTING;SHARP

## 2021-04-14 NOTE — ED NOTES
Patient arrived to ED alert and oriented x4  Patient has complaints of Lower back pain  Patient states that a few days ago he was pushing a cart and turned the wrong way causing back pain  Patient states that currently the pain is in his right buttocks and shoots down his right leg  Patient states that he took ibuprofen last night and it was non effective  Patient is able to bear weight and walk on leg       Patrick Morales RN  04/14/21 2252

## 2021-04-14 NOTE — ED PROVIDER NOTES
[facility] ED  Emergency Department        Pt Name: Salinas Julian  MRN: [id number]  [place] 1965  Date of evaluation: 4/14/21    CHIEF COMPLAINT       Chief Complaint   Patient presents with    Back Pain     Twisted wrong way the other day- Sharp shooting pain in right buttocks down leg       HISTORY OF PRESENT ILLNESS  (Location/Symptom, Timing/Onset, Context/Setting, Quality, Duration, ModifyingFactors, Severity.)      Salinas Julian is a 60 y.o. male who presents with pain to his right lower back with radiation down his right leg, does heavy lifting and twisting at work, and thinks he pulled something, no numbness of tingling, no weakness, no bowel or bladder incontinence. And no h/o IVDU. Took motrin without relief of pain. No abdominal pain, no nausea or vomiting, no fevers. PAST MEDICAL / SURGICAL / SOCIAL / FAMILY HISTORY      has a past medical history of Hypertension. has a past surgical history that includes Ankle surgery. Social History     Socioeconomic History    Marital status:      Spouse name: Not on file    Number of children: Not on file    Years of education: Not on file    Highest education level: Not on file   Occupational History    Not on file   Social Needs    Financial resource strain: Not on file    Food insecurity     Worry: Not on file     Inability: Not on file    Transportation needs     Medical: Not on file     Non-medical: Not on file   Tobacco Use    Smoking status: Current Every Day Smoker     Packs/day: 0.50     Years: 35.00     Pack years: 17.50     Types: Cigarettes    Smokeless tobacco: Never Used    Tobacco comment: pt states he is ready to quit.     Substance and Sexual Activity    Alcohol use: Not Currently     Frequency: Never     Comment: patient stopped drinking 1 year ago 4/14/21    Drug use: No    Sexual activity: Yes     Partners: Female   Lifestyle    Physical activity     Days per week: Not on file continue with tylenol, and motrin, and follow up with pcp. No pulsatile mass, no abdominal pain, and given work history low concern for AAA. No neuro deficits on exam, and stable gait. PLAN (LABS / IMAGING / EKG):  No orders of the defined types were placed in this encounter. MEDICATIONS ORDERED:  Orders Placed This Encounter   Medications    cyclobenzaprine (FLEXERIL) 10 MG tablet     Sig: Take 1 tablet by mouth 3 times daily as needed for Muscle spasms     Dispense:  10 tablet     Refill:  0       DIAGNOSTIC RESULTS / EMERGENCY DEPARTMENT COURSE / MDM     LABS:  No results found for this visit on 04/14/21. IMPRESSION: R sciatica        FINAL IMPRESSION      1.  Right sciatic nerve pain          DISPOSITION / PLAN     DISPOSITION Decision To Discharge 04/14/2021 08:19:15 AM      PATIENT REFERRED TO:  MD Lizzie Curtis [facility] [address] [facility] [address] [address]  [phone]    Schedule an appointment as soon as possible for a visit in 2 days        DISCHARGE MEDICATIONS:  New Prescriptions    CYCLOBENZAPRINE (FLEXERIL) 10 MG TABLET    Take 1 tablet by mouth 3 times daily as needed for Muscle spasms       Cyndie Braswell  8:23 AM    Attending Emergency Physician  [address] ED    (Please note that portions of this note were completed with a voice recognition program.  Effortswere made to edit the dictations but occasionally words are mis-transcribed.)              Liam Mendosa DO  04/14/21 [id number]

## 2021-04-16 ENCOUNTER — TELEPHONE (OUTPATIENT)
Dept: INTERNAL MEDICINE | Age: 56
End: 2021-04-16

## 2021-04-16 NOTE — TELEPHONE ENCOUNTER
Pt is scheduled with the PCR on 4/22 for an ED f/u for sciatica. Pt is in pain and can hardly get out of bed and is asking for anything sooner. Please call to discuss further.

## 2021-04-17 ENCOUNTER — HOSPITAL ENCOUNTER (EMERGENCY)
Age: 56
Discharge: HOME | End: 2021-04-18
Attending: EMERGENCY MEDICINE
Payer: MEDICAID

## 2021-04-17 VITALS
RESPIRATION RATE: 18 BRPM | DIASTOLIC BLOOD PRESSURE: 97 MMHG | SYSTOLIC BLOOD PRESSURE: 168 MMHG | OXYGEN SATURATION: 100 % | TEMPERATURE: 97.2 F | HEART RATE: 87 BPM

## 2021-04-17 DIAGNOSIS — M54.41 ACUTE RIGHT-SIDED LOW BACK PAIN WITH RIGHT-SIDED SCIATICA: ICD-10-CM

## 2021-04-17 DIAGNOSIS — M54.31 SCIATICA OF RIGHT SIDE: Primary | ICD-10-CM

## 2021-04-17 PROCEDURE — 99283 EMERGENCY DEPT VISIT LOW MDM: CPT

## 2021-04-17 RX ORDER — METHOCARBAMOL 500 MG/1
750 TABLET, FILM COATED ORAL ONCE
Status: COMPLETED | OUTPATIENT
Start: 2021-04-18 | End: 2021-04-18

## 2021-04-17 RX ORDER — IBUPROFEN 800 MG/1
800 TABLET ORAL ONCE
Status: COMPLETED | OUTPATIENT
Start: 2021-04-18 | End: 2021-04-18

## 2021-04-17 RX ORDER — LIDOCAINE 4 G/G
1 PATCH TOPICAL DAILY
Status: DISCONTINUED | OUTPATIENT
Start: 2021-04-18 | End: 2021-04-18 | Stop reason: HOSPADM

## 2021-04-17 RX ORDER — ACETAMINOPHEN 500 MG
1000 TABLET ORAL ONCE
Status: COMPLETED | OUTPATIENT
Start: 2021-04-18 | End: 2021-04-18

## 2021-04-17 ASSESSMENT — PAIN DESCRIPTION - PAIN TYPE: TYPE: CHRONIC PAIN

## 2021-04-17 ASSESSMENT — PAIN DESCRIPTION - ORIENTATION: ORIENTATION: LOWER

## 2021-04-18 PROCEDURE — 6370000000 HC RX 637 (ALT 250 FOR IP): Performed by: STUDENT IN AN ORGANIZED HEALTH CARE EDUCATION/TRAINING PROGRAM

## 2021-04-18 RX ORDER — METHOCARBAMOL 750 MG/1
750 TABLET, FILM COATED ORAL 3 TIMES DAILY PRN
Qty: 10 TABLET | Refills: 0 | Status: SHIPPED | OUTPATIENT
Start: 2021-04-18 | End: 2021-04-22 | Stop reason: SDUPTHER

## 2021-04-18 RX ORDER — LIDOCAINE 4 G/G
1 PATCH TOPICAL DAILY
Qty: 2 BOX | Refills: 0 | Status: SHIPPED | OUTPATIENT
Start: 2021-04-18 | End: 2021-05-18

## 2021-04-18 RX ORDER — METHOCARBAMOL 750 MG/1
750 TABLET, FILM COATED ORAL 3 TIMES DAILY PRN
Qty: 30 TABLET | Refills: 0 | Status: SHIPPED | OUTPATIENT
Start: 2021-04-18 | End: 2021-04-18 | Stop reason: SDUPTHER

## 2021-04-18 RX ADMIN — METHOCARBAMOL 750 MG: 500 TABLET ORAL at 00:26

## 2021-04-18 RX ADMIN — IBUPROFEN 800 MG: 800 TABLET, FILM COATED ORAL at 00:25

## 2021-04-18 RX ADMIN — ACETAMINOPHEN 1000 MG: 500 TABLET ORAL at 00:24

## 2021-04-18 ASSESSMENT — ENCOUNTER SYMPTOMS
NAUSEA: 0
RHINORRHEA: 0
SHORTNESS OF BREATH: 0
BACK PAIN: 1
VOMITING: 0
ABDOMINAL PAIN: 0

## 2021-04-18 ASSESSMENT — PAIN SCALES - GENERAL: PAINLEVEL_OUTOF10: 10

## 2021-04-18 NOTE — ED PROVIDER NOTES
[address]  ED  Emergency Department Encounter  Emergency Medicine Resident     Pt Name: Rehan Gardiner  MRN: [id number]  [place] 1965  Date of evaluation: 4/17/21  PCP:  No primary care provider on file. CHIEF COMPLAINT       Chief Complaint   Patient presents with    Back Pain     Pt c/o back pain radiating down rt leg, seen here Wed for same       HISTORY OFPRESENT ILLNESS  (Location/Symptom, Timing/Onset, Context/Setting, Quality, Duration, Modifying Factors,Severity.)      Rehan Gardiner is a 60 y.o. male who presents with right lower back pain. Patient was seen for this last week in the department. He states the pain worsens with movement and radiates down his right leg wrapping around to the front towards the knee. No associated weakness or numbness. No new trauma or injury. 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 No known injury specific to the onset of this pain. He denies any recent illness. No fevers, chills, abdominal pain, nausea, vomiting, other complaints. She has been taking Flexeril at home with little relief. PAST MEDICAL / SURGICAL / SOCIAL / FAMILY HISTORY      has a past medical history of Hypertension. has a past surgical history that includes Ankle surgery.      Social History     Socioeconomic History    Marital status:      Spouse name: Not on file    Number of children: Not on file    Years of education: Not on file    Highest education level: Not on file   Occupational History    Not on file   Social Needs    Financial resource strain: Not on file    Food insecurity     Worry: Not on file     Inability: Not on file    Transportation needs     Medical: Not on file     Non-medical: Not on file   Tobacco Use    Smoking status: Current Every Day Smoker     Packs/day: 0.50     Years: 35.00     Pack years: 17.50     Types: Cigarettes    Smokeless tobacco: Never Used    Tobacco comment: pt states he is ready to quit. Substance and Sexual Activity    Alcohol use: Not Currently     Frequency: Never     Comment: patient stopped drinking 1 year ago 4/14/21    Drug use: No    Sexual activity: Yes     Partners: Female   Lifestyle    Physical activity     Days per week: Not on file     Minutes per session: Not on file    Stress: Not on file   Relationships    Social connections     Talks on phone: Not on file     Gets together: Not on file     Attends Hindu service: Not on file     Active member of club or organization: Not on file     Attends meetings of clubs or organizations: Not on file     Relationship status: Not on file    Intimate partner violence     Fear of current or ex partner: Not on file     Emotionally abused: Not on file     Physically abused: Not on file     Forced sexual activity: Not on file   Other Topics Concern    Not on file   Social History Narrative    Not on file       Family History   Problem Relation Age of Onset    Other Mother         seizures    High Blood Pressure Mother     High Blood Pressure Father     High Blood Pressure Sister     High Blood Pressure Brother         Allergies:  Patient has no known allergies. Home Medications:  Prior to Admission medications    Medication Sig Start Date End Date Taking?  Authorizing Provider   lidocaine 4 % external patch Place 1 patch onto the skin daily 4/18/21 5/18/21 Yes Fabiola Alexandre, DO   methocarbamol (ROBAXIN-750) 750 MG tablet Take 1 tablet by mouth 3 times daily as needed (for back pain) Do not take if driving or operating machinery as this can make you drowsy 4/18/21  Yes Janell Casillas, DO   cyclobenzaprine (FLEXERIL) 10 MG tablet Take 1 tablet by mouth 3 times daily as needed for Muscle spasms 4/14/21 4/24/21  Sabina Woodson, DO   lisinopril-hydrochlorothiazide (PRINZIDE;ZESTORETIC) 10-12.5 MG per tablet Take 1 tablet by mouth daily 5/18/19   Kelley Patel MD hydrocortisone (ANUSOL-HC) 2.5 % rectal cream Place rectally 2 times daily. 3/7/19   Florence Coyle MD   hydrochlorothiazide (MICROZIDE) 12.5 MG capsule Take 1 capsule by mouth daily 1/6/19   Abhilash Lizarraga PA-C       REVIEW OFSYSTEMS    (2-9 systems for level 4, 10 or more for level 5)      Review of Systems   Constitutional: Negative for chills and fever. HENT: Negative for congestion and rhinorrhea. Eyes: Negative for visual disturbance. Respiratory: Negative for shortness of breath. Cardiovascular: Negative for chest pain. Gastrointestinal: Negative for abdominal pain, nausea and vomiting. Musculoskeletal: Positive for back pain and myalgias. Skin: Negative for rash and wound. Neurological: Negative for dizziness, weakness, numbness and headaches. PHYSICAL EXAM   (up to 7 for level 4, 8 or more forlevel 5)      INITIAL VITALS:   ED Triage Vitals [04/17/21 2054]   BP Temp Temp Source Pulse Resp SpO2 Height Weight   (!) 173/97 97.2 °F (36.2 °C) Tympanic 87 18 100 % -- --       Physical Exam  Constitutional:       General: He is not in acute distress. Appearance: Normal appearance. He is not ill-appearing, toxic-appearing or diaphoretic. HENT:      Head: Normocephalic and atraumatic. Eyes:      Extraocular Movements: Extraocular movements intact. Neck:      Musculoskeletal: Normal range of motion and neck supple. Cardiovascular:      Rate and Rhythm: Normal rate and regular rhythm. Heart sounds: Normal heart sounds. No murmur. Pulmonary:      Effort: Pulmonary effort is normal. No respiratory distress. Breath sounds: Normal breath sounds. No wheezing or rhonchi. Abdominal:      Palpations: Abdomen is soft. Tenderness: There is no abdominal tenderness. Musculoskeletal: Normal range of motion. Comments: Mild tenderness to palpation the low right paraspinal and upper gluteal muscles. Flexion at the hip makes pain worse. No overlying bruising or rash. Skin:     General: Skin is warm and dry. Neurological:      General: No focal deficit present. Mental Status: He is alert and oriented to person, place, and time. Sensory: No sensory deficit. Motor: No weakness. Psychiatric:         Mood and Affect: Mood normal.         DIFFERENTIAL  DIAGNOSIS     PLAN (LABS / IMAGING / EKG):  No orders of the defined types were placed in this encounter. MEDICATIONS ORDERED:  Orders Placed This Encounter   Medications    acetaminophen (TYLENOL) tablet 1,000 mg    ibuprofen (ADVIL;MOTRIN) tablet 800 mg    methocarbamol (ROBAXIN) tablet 750 mg    DISCONTD: lidocaine 4 % external patch 1 patch    lidocaine 4 % external patch     Sig: Place 1 patch onto the skin daily     Dispense:  2 box     Refill:  0    DISCONTD: methocarbamol (ROBAXIN-750) 750 MG tablet     Sig: Take 1 tablet by mouth 3 times daily as needed (for back pain) Do not take if driving or operating machinery as this can make you drowsy     Dispense:  30 tablet     Refill:  0    methocarbamol (ROBAXIN-750) 750 MG tablet     Sig: Take 1 tablet by mouth 3 times daily as needed (for back pain) Do not take if driving or operating machinery as this can make you drowsy     Dispense:  10 tablet     Refill:  0       Initial MDM/Plan/ED COURSE:    60 y.o. male who presents with right lower back pain radiating into his right leg. Patient was seen here last week for this and he used Flexeril with little relief. On exam, patient is in no acute distress. Vitals are stable, blood pressure slightly elevated at 168/97. He is otherwise afebrile and remainder vitals are within normal limits. On exam, patient has minimal tenderness palpation at the right lower paraspinal and right upper gluteal muscles. Flexion of the hip makes the pain worse. Neuro exam is normal, he has good strength in the lower extremities that is metric. No sensation deficit. No overlying rash or wounds.   Discussed with patient that this is likely sciatica and will take some time to recover with the use of stretches and conservative treatment. He does have follow-up with his PCP this week and I encouraged him to talk about physical therapy or other ways to treat sciatica. While here, we will give him Tylenol, ibuprofen, lidocaine patch, and try different muscle relaxant with Robaxin. Patient discharged with prescriptions for lidocaine patches and Robaxin. He has a follow-up with his PCP this week. Again urged him to discuss further management of this.      :     DIAGNOSTIC RESULTS / EMERGENCYDEPARTMENT COURSE / MDM     LABS:  Labs Reviewed - No data to display        No results found. EKG  Not indicated    All EKG's are interpreted by the Emergency Department Physicianwho either signs or Co-signs this chart in the absence of a cardiologist.      PROCEDURES:  None    CONSULTS:  None    CRITICAL CARE:  Please see attending note    FINAL IMPRESSION      1. Sciatica of right side    2. Acute right-sided low back pain with right-sided sciatica          DISPOSITION / PLAN     DISPOSITION Decision To Discharge 04/18/2021 12:28:50 AM      PATIENT REFERRED TO:  No follow-up provider specified.     DISCHARGE MEDICATIONS:  Discharge Medication List as of 4/18/2021 12:34 AM      START taking these medications    Details   lidocaine 4 % external patch Place 1 patch onto the skin daily, Transdermal, DAILY Starting Sun 4/18/2021, Until Tue 5/18/2021, For 30 days, Disp-2 box, R-0, [facility],   Emergency Medicine Resident    (Please note that portions of this note were completed with a voice recognition program.Efforts were made to edit the dictations but occasionally words are mis-transcribed.)       Get Velazquez DO  Resident  04/18/21 1969

## 2021-04-20 NOTE — ED PROVIDER NOTES
[facility] ED  Emergency Department  Faculty Attestation       I performed a history and physical examination of the patient and discussed management with the resident. I reviewed the residents note and agree with the documented findings including all diagnostic interpretations and plan of care. Any areas of disagreement are noted on the chart. I was personally present for the key portions of any procedures. I have documented in the chart those procedures where I was not present during the key portions. I have reviewed the emergency nurses triage note. I agree with the chief complaint, past medical history, past surgical history, allergies, medications, social and family history as documented unless otherwise noted below. Documentation of the HPI, Physical Exam and Medical Decision Making performed by floridaibdidi is based on my personal performance of the HPI, PE and MDM. For Physician Assistant/ Nurse Practitioner cases/documentation I have personally evaluated this patient and have completed at least one if not all key elements of the E/M (history, physical exam, and MDM). Additional findings are as noted. Pertinent Comments     Primary Care Physician: No primary care provider on file. ED Triage Vitals [04/17/21 2054]   BP Temp Temp Source Pulse Resp SpO2 Height Weight   (!) 173/97 97.2 °F (36.2 °C) Tympanic 87 18 100 % -- --        History/Physical: This is a 60 y.o. male who presents to the Emergency Department with complaint of right-sided back pain. Was seen approximately 3 days prior for similar symptoms. Pain goes down the right leg, but there is no weakness or numbness. No bowel bladder incontinence. No fevers. Does lift heavy objects, denies any blunt trauma. On exam patient is sitting on the edge of the bed comfortably in no significant distress. Normocephalic atraumatic. Heart sounds regular lungs are auscultation. There is no midline thoracic or lumbar tenderness. With no step-off or deformity. There is tenderness over the right lower paraspinal region with some spasm. Does have some discomfort with movement of the right leg, but there is no weakness or loss of sensation in the extremities. Is able to ambulate without difficulty. MDM/Plan:   Back pain with right-sided sciatica. Patient ambulating without difficulty.   Likely musculoskeletal strain versus disc herniation  Flexeril not working at home  No red flag symptoms and no signs of cauda equina  Will do symptomatic treatment and switch the muscle relaxant to see if there is better relief  Follow-up outpatient, okay for discharge      CRITICAL CARE: None     Daniel Britt MD  Attending Emergency Physician        Daniel Britt MD  04/20/21 8546

## 2021-04-22 ENCOUNTER — OFFICE VISIT (OUTPATIENT)
Dept: INTERNAL MEDICINE | Age: 56
End: 2021-04-22
Payer: MEDICAID

## 2021-04-22 VITALS
BODY MASS INDEX: 24.45 KG/M2 | DIASTOLIC BLOOD PRESSURE: 98 MMHG | HEIGHT: 70 IN | SYSTOLIC BLOOD PRESSURE: 165 MMHG | HEART RATE: 74 BPM | WEIGHT: 170.8 LBS

## 2021-04-22 DIAGNOSIS — Z11.4 SCREENING FOR HIV (HUMAN IMMUNODEFICIENCY VIRUS): ICD-10-CM

## 2021-04-22 DIAGNOSIS — M54.16 RIGHT LUMBAR RADICULOPATHY: ICD-10-CM

## 2021-04-22 DIAGNOSIS — I10 ESSENTIAL HYPERTENSION: ICD-10-CM

## 2021-04-22 DIAGNOSIS — M54.31 SCIATICA OF RIGHT SIDE: Primary | ICD-10-CM

## 2021-04-22 DIAGNOSIS — Z00.00 HEALTH CARE MAINTENANCE: ICD-10-CM

## 2021-04-22 DIAGNOSIS — F17.200 TOBACCO USE DISORDER: ICD-10-CM

## 2021-04-22 DIAGNOSIS — Z12.11 SCREENING FOR COLON CANCER: ICD-10-CM

## 2021-04-22 DIAGNOSIS — Z11.59 ENCOUNTER FOR HEPATITIS C SCREENING TEST FOR LOW RISK PATIENT: ICD-10-CM

## 2021-04-22 PROCEDURE — 99213 OFFICE O/P EST LOW 20 MIN: CPT | Performed by: STUDENT IN AN ORGANIZED HEALTH CARE EDUCATION/TRAINING PROGRAM

## 2021-04-22 PROCEDURE — 3017F COLORECTAL CA SCREEN DOC REV: CPT | Performed by: STUDENT IN AN ORGANIZED HEALTH CARE EDUCATION/TRAINING PROGRAM

## 2021-04-22 PROCEDURE — 1111F DSCHRG MED/CURRENT MED MERGE: CPT | Performed by: STUDENT IN AN ORGANIZED HEALTH CARE EDUCATION/TRAINING PROGRAM

## 2021-04-22 PROCEDURE — 4004F PT TOBACCO SCREEN RCVD TLK: CPT | Performed by: STUDENT IN AN ORGANIZED HEALTH CARE EDUCATION/TRAINING PROGRAM

## 2021-04-22 PROCEDURE — G8427 DOCREV CUR MEDS BY ELIG CLIN: HCPCS | Performed by: STUDENT IN AN ORGANIZED HEALTH CARE EDUCATION/TRAINING PROGRAM

## 2021-04-22 PROCEDURE — G8420 CALC BMI NORM PARAMETERS: HCPCS | Performed by: STUDENT IN AN ORGANIZED HEALTH CARE EDUCATION/TRAINING PROGRAM

## 2021-04-22 RX ORDER — LISINOPRIL AND HYDROCHLOROTHIAZIDE 12.5; 1 MG/1; MG/1
1 TABLET ORAL DAILY
Qty: 30 TABLET | Refills: 1 | Status: SHIPPED | OUTPATIENT
Start: 2021-04-22 | End: 2021-06-19 | Stop reason: SDUPTHER

## 2021-04-22 RX ORDER — METHOCARBAMOL 750 MG/1
750 TABLET, FILM COATED ORAL 3 TIMES DAILY PRN
Qty: 30 TABLET | Refills: 0 | Status: SHIPPED | OUTPATIENT
Start: 2021-04-22 | End: 2021-07-30

## 2021-04-22 RX ORDER — B-COMPLEX WITH VITAMIN C
1 TABLET ORAL DAILY
Qty: 60 TABLET | Refills: 0 | Status: SHIPPED | OUTPATIENT
Start: 2021-04-22 | End: 2021-06-21

## 2021-04-22 RX ORDER — ZOSTER VACCINE RECOMBINANT, ADJUVANTED 50 MCG/0.5
0.5 KIT INTRAMUSCULAR SEE ADMIN INSTRUCTIONS
Qty: 0.5 ML | Refills: 0 | Status: SHIPPED | OUTPATIENT
Start: 2021-04-22 | End: 2021-10-19

## 2021-04-22 RX ORDER — TRAMADOL HYDROCHLORIDE 50 MG/1
50 TABLET ORAL EVERY 8 HOURS PRN
Qty: 20 TABLET | Refills: 0 | Status: SHIPPED | OUTPATIENT
Start: 2021-04-22 | End: 2021-05-19 | Stop reason: SDUPTHER

## 2021-04-22 RX ORDER — OXYCODONE HYDROCHLORIDE AND ACETAMINOPHEN 5; 325 MG/1; MG/1
1 TABLET ORAL EVERY 6 HOURS PRN
Qty: 15 TABLET | Refills: 0 | Status: CANCELLED | OUTPATIENT
Start: 2021-04-22 | End: 2021-04-29

## 2021-04-22 RX ORDER — CHOLECALCIFEROL (VITAMIN D3) 125 MCG
500 CAPSULE ORAL DAILY
Qty: 90 TABLET | Refills: 0 | Status: SHIPPED | OUTPATIENT
Start: 2021-04-22 | End: 2021-08-04 | Stop reason: ALTCHOICE

## 2021-04-22 SDOH — ECONOMIC STABILITY: TRANSPORTATION INSECURITY
IN THE PAST 12 MONTHS, HAS THE LACK OF TRANSPORTATION KEPT YOU FROM MEDICAL APPOINTMENTS OR FROM GETTING MEDICATIONS?: NO

## 2021-04-22 SDOH — ECONOMIC STABILITY: TRANSPORTATION INSECURITY
IN THE PAST 12 MONTHS, HAS LACK OF TRANSPORTATION KEPT YOU FROM MEETINGS, WORK, OR FROM GETTING THINGS NEEDED FOR DAILY LIVING?: NO

## 2021-04-22 SDOH — ECONOMIC STABILITY: INCOME INSECURITY: HOW HARD IS IT FOR YOU TO PAY FOR THE VERY BASICS LIKE FOOD, HOUSING, MEDICAL CARE, AND HEATING?: VERY HARD

## 2021-04-22 ASSESSMENT — PATIENT HEALTH QUESTIONNAIRE - PHQ9
1. LITTLE INTEREST OR PLEASURE IN DOING THINGS: 0
SUM OF ALL RESPONSES TO PHQ QUESTIONS 1-9: 0

## 2021-04-22 NOTE — PROGRESS NOTES
Post-Discharge Transitional Care Management Services or Hospital Follow Up      [address] [facility]   YOB: 1965    Date of Office Visit:  4/22/2021  Date of Hospital Admission: 4/17/21  Date of Hospital Discharge: 4/18/21  Risk of hospital readmission (high >=14%. Medium >=10%) :No data recorded    Care management risk score Rising risk (score 2-5) and Complex Care (Scores >=6): 1     Non face to face  following discharge, date last encounter closed (first attempt may have been earlier): *No documented post hospital discharge outreach found in the last 14 days    Call initiated 2 business days of discharge: *No response recorded in the last 14 days    Patient Active Problem List   Diagnosis    Neuropathy    Hypertension    Dyslipidemia    Tobacco use disorder    Obesity    Alcohol use       No Known Allergies    Medications listed as ordered at the time of discharge from hospital   [facility] [address] [place] [address] Medication Instructions [id number]    Printed on:04/22/21 2674   Medication Information                      cyclobenzaprine (FLEXERIL) 10 MG tablet  Take 1 tablet by mouth 3 times daily as needed for Muscle spasms             hydrochlorothiazide (MICROZIDE) 12.5 MG capsule  Take 1 capsule by mouth daily             hydrocortisone (ANUSOL-HC) 2.5 % rectal cream  Place rectally 2 times daily.              lidocaine 4 % external patch  Place 1 patch onto the skin daily             lisinopril-hydrochlorothiazide (PRINZIDE;ZESTORETIC) 10-12.5 MG per tablet  Take 1 tablet by mouth daily             methocarbamol (ROBAXIN-750) 750 MG tablet  Take 1 tablet by mouth 3 times daily as needed (for back pain) Do not take if driving or operating machinery as this can make you drowsy                   Medications marked \"taking\" at this time  Outpatient Medications Marked as Taking for the 4/22/21 encounter (Office Visit) with Jacqueline Quiros MD   Medication Sig Dispense Refill    lidocaine 4 % external patch Place 1 patch onto the skin daily 2 box 0        Medications patient taking as of now reconciled against medications ordered at time of hospital discharge: No    Chief Complaint   Patient presents with   [address] from [facility] ER 4/17 for back pain    Health Maintenance     Due for Hep C, HIV, Lipid, Shingrix, Colonoscopy, CMP (pending) Covid Vaccine pneumovax 23, Tdap       History of Present illness - Follow up of Hospital diagnosis(es):   Right-sided sciatica    Inpatient course: Discharge summary reviewed- see chart. Interval history/Current status:  Post discharge patient still has sharp shooting pain in the right buttock 10/10 in intensity radiating down to his right leg associated with numbness and tingling as well. Mentioned minimal weakness in right leg which gives away sometimes. Otherwise able to ambulate on his own without any issues. No recent trauma, URI, back pain or tenderness. Patient mentioned that her symptoms started 2 to 3 weeks ago when he was trying to pull some heavy objects at work. Evaluated in ER for same. No imaging done at that time. Previous lumbar x-ray done few days ago showed degenerative changes in L4-L5. He has history of essential hypertension. Has not taken his medications in a while. Denied any chest pain, pedal edema, orthopnea PND, headache or any other symptoms. Current blood pressure 167/103 heart rate 79. Blood pressure has been in systolic 558D the last 3 encounters. Current everyday smoker. 17-pack-year history of smoking present. Advised to quit. Health maintenance  Due for all vaccinations and colonoscopy.     A comprehensive review of systems was negative except for: Musculoskeletal: positive for right buttock pain  Neurological: positive for numbness and tingling on right leg    Vitals:    04/22/21 0929   BP: (!) 167/103   Pulse: 79   Weight: 170 lb 12.8 oz (77.5 kg)   Height: 5' 10\" (1.778 m)     Body mass index is 24.51 kg/m². Wt Readings from Last 3 Encounters:   04/22/21 170 lb 12.8 oz (77.5 kg)   04/14/21 180 lb (81.6 kg)   12/03/19 195 lb (88.5 kg)     BP Readings from Last 3 Encounters:   04/22/21 (!) 167/103   04/17/21 (!) 168/97   04/14/21 (!) 163/91        Physical Exam:  General Appearance: alert and oriented to person, place and time, well developed and well- nourished, in no acute distress  Skin: warm and dry, no rash or erythema  Neck supple and non-tender without mass, no thyromegaly or thyroid nodules, no cervical lymphadenopathy  Pulmonary/Chest: clear to auscultation bilaterally- no wheezes, rales or rhonchi, normal air movement, no respiratory distress  Cardiovascular: normal rate, regular rhythm, normal S1 and S2, no murmurs, rubs, clicks, or gallops, distal pulses intact, no carotid bruits  Abdomen: soft, non-tender, non-distended, normal bowel sounds, no masses or organomegaly  Extremities: no cyanosis, clubbing or edema  Musculoskeletal: Tenderness in right buttock(PSIS) normal range of motion, no joint swelling, deformity or tenderness  Neurologic: reflexes normal and symmetric, no cranial nerve deficit, gait, coordination and speech normal    Assessment/Plan:  1. Sciatica of right [id number] MRI lumbar spine. B12, Os-Khari.  Vitamin D supplementation. Advised exercises. Continue Robaxin 750 3 times daily, lidocaine patch. Toradol for 7 days. Will start Duloxetine if needed    2. Essential hypertension-uncontrolled due to noncompliance  Resumed lisinopril 10 and HCTZ 12.5 daily. Advised medication compliance. Discussed in details the cardiovascular risks of hypertension. 3. Rt Lumbar radiculopathy  Previous history of degenerative disease of L4-L5. Follow-up MRI lumbar spine. B12 supplementation. Os-Khari.  Vitamin D supplementation. 4. Tobacco use disorder  Advised for smoking cessation.   Advised cardiovascular risks of smoking        Medical Decision Making: moderate complexity    Summer Pierce MD      Department of Internal Medicine  [facility] [place]         4/22/2021, 9:58 AM

## 2021-04-22 NOTE — PROGRESS NOTES
Attending Physician Statement  I have discussed the care of China Kebede, including pertinent history and exam findings with the resident. I have reviewed the key elements of all parts of the encounter with the resident. I agree with the assessment, and status of the problem list as documented. Diagnosis Orders   1. Sciatica of right side  MRI LUMBAR SPINE W WO CONTRAST    vitamin B-12 (CYANOCOBALAMIN) 500 MCG tablet    Calcium Carbonate-Vitamin D (OYSTER SHELL CALCIUM/D) 500-200 MG-UNIT TABS    methocarbamol (ROBAXIN-750) 750 MG tablet    TX DISCHARGE MEDS RECONCILED W/ CURRENT OUTPATIENT MED LIST    traMADol (ULTRAM) 50 MG tablet   2. Essential hypertension  Comprehensive Metabolic Panel    lisinopril-hydroCHLOROthiazide (PRINZIDE;ZESTORETIC) 10-12.5 MG per tablet   3. Tobacco use disorder     4. Right lumbar radiculopathy     5. Health care maintenance  Lipid Panel    zoster recombinant adjuvanted vaccine [facility]) 50 MCG/0.5ML SUSR injection   6. Screening for colon cancer  [facility] Scre[facility] Colonoscopy   7. Encounter for hepatitis C screening test for low risk patient  Hepatitis C Antibody   8. Screening for HIV (human immunodeficiency virus)  HIV-1 And HIV-2 Antibodies      The plan and orders should include   Orders Placed This Encounter   Procedures    MRI LUMBAR SPINE W WO CONTRAST    Hepatitis C Antibody    HIV-1 And HIV-2 Antibodies    Comprehensive Metabolic Panel    Lipid Panel    [facility]    TX DISCHARGE MEDS RECONCILED W/ CURRENT OUTPATIENT MED LIST    and this was also documented by the resident.    Resume BP meds and rtc in 1-2 weeks    Dr Yoandy Puentes MD, [facility]  Associate , Department of Internal Medicine  Resident Ambulatory Site Medical Director  [address] [facility] Internal Medicine  [address] [facility]  Internal Medicine Clerkship - Mandie Bello    4/22/2021, 10:19 AM

## 2021-04-22 NOTE — LETTER
[address]  [address] [facility] [address]  Phone: [phone]  Fax: [phone]    Vidal Rae MD        April 22, 2021     Patient: Faith Martinez   YOB: 1965   Date of Visit: 4/22/2021       To Whom it May Concern:    Troy Brower was seen in my clinic on 4/22/2021. He may return to work on 4/23/2021. He is prohibited to pick/pull heavy weights because of lumbar disc disease along with Sciatica. If you have any questions or concerns, please don't hesitate to call.     Sincerely,         Vidal Rae MD

## 2021-04-22 NOTE — PATIENT INSTRUCTIONS
Patient Education        Sciatica: Care Instructions  Your Care Instructions     Sciatica (say \"uoj-LL-vt-kuh\") is an irritation of one of the sciatic nerves, which come from the spinal cord in the lower back. The sciatic nerves and their branches extend down through the buttock to the foot. Sciatica can develop when an injured disc in the back irritates or presses against a spinal nerve root. Its main symptom is pain, numbness, or weakness that is often worse in the leg or foot than in the back. Sciatica often will improve and go away with time. Early treatment usually includes medicines and exercises to relieve pain. Follow-up care is a key part of your treatment and safety. Be sure to make and go to all appointments, and call your doctor if you are having problems. It's also a good idea to know your test results and keep a list of the medicines you take. How can you care for yourself at home? · Take pain medicines exactly as directed. ? If the doctor gave you a prescription medicine for pain, take it as prescribed. ? If you are not taking a prescription pain medicine, ask your doctor if you can take an over-the-counter medicine. · Use heat or ice to relieve pain. ? To apply heat, put a warm water bottle, heating pad set on low, or warm cloth on your back. Do not go to sleep with a heating pad on your skin. ? To use ice, put ice or a cold pack on the area for 10 to 20 minutes at a time. Put a thin cloth between the ice and your skin. · Avoid sitting if possible, unless it feels better than standing. · Alternate lying down with short walks. Increase your walking distance as you are able to without making your symptoms worse. · Do not do anything that makes your symptoms worse. When should you call for help? Call 911 anytime you think you may need emergency care. For example, call if:    · You are unable to move a leg at all.    Call your doctor now or seek immediate medical care if:    · You have new or worse symptoms in your legs or buttocks. Symptoms may include:  ? Numbness or tingling. ? Weakness. ? Pain.     · You lose bladder or bowel control. Watch closely for changes in your health, and be sure to contact your doctor if:    · You are not getting better as expected. Where can you learn more? Go to https://chpemanjeetewtootie.GlySure. org and sign in to your goTenna account. Enter [phone] in the [facility] box to learn more about \"Sciatica: Care Instructions. \"     If you do not have an account, please click on the \"Sign Up Now\" link. Current as of: November 16, 2020               Content Version: 12.8  © 2006-2021 [email or web address]. Care instructions adapted under license by [facility] ([facility]). If you have questions about a medical condition or this instruction, always ask your healthcare professional. [place] [address] any warranty or liability for your use of this information. Medications e-scribe to pharmacy of pt's choice. Letter for work given to patient. Printed and signed script for Shingrix given to pt. Scripts for lab given to pt with fasting instructions, pt will get labs done before next appointment. Order for MRI of Lumbar Spine faxed to [facility] they will call pt for appt. Please call [phone] in not heard within 2 weeks. Referral for colonoscopy sent to [facility], they will call pt for appt. Copy of referral with address and number given to pt. Patient to return to clinic 2 weeks (5/7/21). AVS reviewed and given to pt. It is very important for your care that you keep your appointment. If for some reason you are unable to keep your appointment it is equally important that you call our office at [phone] to cancel your appointment and reschedule. Failure to do so may result in your termination from our practice.   MB

## 2021-05-13 ENCOUNTER — TELEPHONE (OUTPATIENT)
Dept: INTERNAL MEDICINE | Age: 56
End: 2021-05-13

## 2021-05-13 ENCOUNTER — HOSPITAL ENCOUNTER (OUTPATIENT)
Dept: MRI IMAGING | Age: 56
Discharge: HOME | End: 2021-05-15
Payer: MEDICAID

## 2021-05-13 DIAGNOSIS — M54.31 SCIATICA OF RIGHT SIDE: Primary | ICD-10-CM

## 2021-05-13 DIAGNOSIS — M54.31 SCIATICA OF RIGHT SIDE: ICD-10-CM

## 2021-05-13 PROCEDURE — 72148 MRI LUMBAR SPINE W/O DYE: CPT

## 2021-05-14 DIAGNOSIS — M48.062 SPINAL STENOSIS OF LUMBAR REGION WITH NEUROGENIC CLAUDICATION: Primary | ICD-10-CM

## 2021-05-18 ENCOUNTER — TELEPHONE (OUTPATIENT)
Dept: INTERNAL MEDICINE | Age: 56
End: 2021-05-18

## 2021-05-18 DIAGNOSIS — M54.31 SCIATICA OF RIGHT SIDE: ICD-10-CM

## 2021-05-18 NOTE — TELEPHONE ENCOUNTER
Tramadol refill      Last seen 04/22/2021     Next Visit Date:  No future appointments.     Health Maintenance   Topic Date Due    Hepatitis C screen  Never done    Pneumococcal 0-64 years Vaccine (1 of 2 - PPSV23) Never done    COVID-19 Vaccine (1) Never done    HIV screen  Never done    DTaP/Tdap/Td vaccine (1 - Tdap) Never done    Lipid screen  Never done    Shingles Vaccine (1 of 2) Never done    Colon cancer screen colonoscopy  Never done    Potassium monitoring  12/03/2020    Creatinine monitoring  12/03/2020    Flu vaccine (Season Ended) 09/01/2021    Hepatitis A vaccine  Aged Out    Hepatitis B vaccine  Aged Out    Hib vaccine  Aged Out    Meningococcal (ACWY) vaccine  Aged Out       No results found for: LABA1C          ( goal A1C is < 7)   No results found for: LABMICR  No results found for: LDLCHOLESTEROL, LDLCALC    (goal LDL is <100)   BUN (mg/dL)   Date Value   12/03/2019 15     BP Readings from Last 3 Encounters:   04/22/21 (!) 165/98   04/17/21 (!) 168/97   04/14/21 (!) 163/91          (goal 120/80)    All Future Testing planned in [facility]  Lab Frequency Next Occurrence   Hepatitis C Antibody Once 07/22/2021   HIV-1 And HIV-2 Antibodies Once 07/22/2021   Comprehensive Metabolic Panel Once 01/70/1770   Lipid Panel Once 07/22/2021               Patient Active Problem List:     Neuropathy     Hypertension     Dyslipidemia     Tobacco use disorder     Obesity     Alcohol use

## 2021-05-19 RX ORDER — TRAMADOL HYDROCHLORIDE 50 MG/1
50 TABLET ORAL EVERY 8 HOURS PRN
Qty: 20 TABLET | Refills: 0 | Status: SHIPPED | OUTPATIENT
Start: 2021-05-19 | End: 2021-05-26

## 2021-06-19 DIAGNOSIS — I10 ESSENTIAL HYPERTENSION: ICD-10-CM

## 2021-06-19 RX ORDER — LISINOPRIL AND HYDROCHLOROTHIAZIDE 12.5; 1 MG/1; MG/1
1 TABLET ORAL DAILY
Qty: 30 TABLET | Refills: 5 | Status: SHIPPED | OUTPATIENT
Start: 2021-06-19 | End: 2022-01-12 | Stop reason: SDUPTHER

## 2021-06-21 ENCOUNTER — TELEPHONE (OUTPATIENT)
Dept: INTERNAL MEDICINE | Age: 56
End: 2021-06-21

## 2021-06-21 NOTE — TELEPHONE ENCOUNTER
----- Message from Mariely Ibrahim sent at 6/21/2021  1:41 PM EDT -----  Subject: Refill Request    QUESTIONS  Name of Medication? lisinopril-hydroCHLOROthiazide (PRINZIDE;ZESTORETIC)   10-12.5 MG per tablet  Patient-reported dosage and instructions? 10-12.5MG/Once A Day  How many days do you have left? 0  Preferred Pharmacy? [address] [place] [address],[address] phone number (if available)? [phone]  Additional Information for Provider? patient is completely out of BP meds   needs refill asap   ---------------------------------------------------------------------------  --------------  CALL BACK INFO  What is the best way for the office to contact you? OK to leave message on   voicemail  Preferred Call Back Phone Number?  [phone]

## 2021-07-26 DIAGNOSIS — M54.31 SCIATICA OF RIGHT SIDE: ICD-10-CM

## 2021-07-30 ENCOUNTER — TELEPHONE (OUTPATIENT)
Dept: INTERNAL MEDICINE | Age: 56
End: 2021-07-30

## 2021-07-30 NOTE — LETTER
[address]  [address] [address]  Phone: [phone]  Fax: [phone]    Shayy Soriano MD        July 30, 2021    [address] [address]      Dear Rhona Vann: This letter is a reminder that you may have diagnostic testing that has not been completed. It is important to your well-being that these test(s) are performed. Please find the outstanding test(s) attached. If you could please have these completed before your next appointment. You can have the test completed at any [facility] facility or Lab. Please see the order for scheduling instructions. Any testing that needs completed other than blood work or xray's please call [phone] to schedule an appointment. Otherwise can be done at any [facility]. Please call our office at Dept: [phone] for additional information on the outstanding tests or let us know if they have been completed so we may update your chart. If you have any questions or concerns, please don't hesitate to call.     Sincerely,        Shayy Soriano MD

## 2021-07-30 NOTE — TELEPHONE ENCOUNTER
Request for Robaxin.     Multiple attempts to reach patient no answer, will send note to pharmacy last seen 4/22/2021    Next Visit Date:  Future Appointments   Date Time Provider Cassius Arevalo   8/4/2021 10:50 AM DO Alma Nielsen [facility] [address] Maintenance   Topic Date Due    Hepatitis C screen  Never done    Pneumococcal 0-64 years Vaccine (1 of 2 - PPSV23) Never done    COVID-19 Vaccine (1) Never done    HIV screen  Never done    DTaP/Tdap/Td vaccine (1 - Tdap) Never done    Lipid screen  Never done    Colon cancer screen colonoscopy  Never done    Shingles Vaccine (1 of 2) Never done    Potassium monitoring  12/03/2020    Creatinine monitoring  12/03/2020    Flu vaccine (1) 09/01/2021    Hepatitis A vaccine  Aged Out    Hepatitis B vaccine  Aged Out    Hib vaccine  Aged Out    Meningococcal (ACWY) vaccine  Aged Out       No results found for: LABA1C          ( goal A1C is < 7)   No results found for: LABMICR  No results found for: LDLCHOLESTEROL, LDLCALC    (goal LDL is <100)   BUN (mg/dL)   Date Value   12/03/2019 15     BP Readings from Last 3 Encounters:   04/22/21 (!) 165/98   04/17/21 (!) 168/97   04/14/21 (!) 163/91          (goal 120/80)    All Future Testing planned in [facility]  Lab Frequency Next Occurrence   Hepatitis C Antibody Once 07/22/2021   HIV-1 And HIV-2 Antibodies Once 07/22/2021   Comprehensive Metabolic Panel Once 08/32/9879   Lipid Panel Once 07/22/2021         Patient Active Problem List:     Neuropathy     Hypertension     Dyslipidemia     Tobacco use disorder     Obesity     Alcohol use

## 2021-08-03 RX ORDER — METHOCARBAMOL 750 MG/1
TABLET, FILM COATED ORAL
Qty: 30 TABLET | Refills: 0 | Status: SHIPPED | OUTPATIENT
Start: 2021-08-03 | End: 2021-08-04 | Stop reason: ALTCHOICE

## 2021-08-04 ENCOUNTER — HOSPITAL ENCOUNTER (OUTPATIENT)
Age: 56
Discharge: HOME | End: 2021-08-06
Payer: COMMERCIAL

## 2021-08-04 ENCOUNTER — HOSPITAL ENCOUNTER (OUTPATIENT)
Dept: GENERAL RADIOLOGY | Age: 56
Discharge: HOME | End: 2021-08-06
Payer: COMMERCIAL

## 2021-08-04 ENCOUNTER — OFFICE VISIT (OUTPATIENT)
Dept: NEUROSURGERY | Age: 56
End: 2021-08-04
Payer: COMMERCIAL

## 2021-08-04 VITALS
WEIGHT: 170 LBS | DIASTOLIC BLOOD PRESSURE: 85 MMHG | SYSTOLIC BLOOD PRESSURE: 151 MMHG | HEART RATE: 87 BPM | BODY MASS INDEX: 24.39 KG/M2 | RESPIRATION RATE: 16 BRPM

## 2021-08-04 DIAGNOSIS — M47.26 OTHER SPONDYLOSIS WITH RADICULOPATHY, LUMBAR REGION: ICD-10-CM

## 2021-08-04 DIAGNOSIS — M48.061 SPINAL STENOSIS OF LUMBAR REGION WITHOUT NEUROGENIC CLAUDICATION: ICD-10-CM

## 2021-08-04 DIAGNOSIS — M48.061 SPINAL STENOSIS OF LUMBAR REGION WITHOUT NEUROGENIC CLAUDICATION: Primary | ICD-10-CM

## 2021-08-04 PROCEDURE — G8427 DOCREV CUR MEDS BY ELIG CLIN: HCPCS | Performed by: NEUROLOGICAL SURGERY

## 2021-08-04 PROCEDURE — 72120 X-RAY BEND ONLY L-S SPINE: CPT

## 2021-08-04 PROCEDURE — G8420 CALC BMI NORM PARAMETERS: HCPCS | Performed by: NEUROLOGICAL SURGERY

## 2021-08-04 PROCEDURE — 4004F PT TOBACCO SCREEN RCVD TLK: CPT | Performed by: NEUROLOGICAL SURGERY

## 2021-08-04 PROCEDURE — 99204 OFFICE O/P NEW MOD 45 MIN: CPT | Performed by: NEUROLOGICAL SURGERY

## 2021-08-04 PROCEDURE — 3017F COLORECTAL CA SCREEN DOC REV: CPT | Performed by: NEUROLOGICAL SURGERY

## 2021-08-04 RX ORDER — MELOXICAM 15 MG/1
15 TABLET ORAL DAILY
Qty: 30 TABLET | Refills: 0 | Status: SHIPPED | OUTPATIENT
Start: 2021-08-04 | End: 2021-09-03

## 2021-08-04 NOTE — PROGRESS NOTES
Lydia  [address] [facility] [address] [facility] [address]  Dept: [phone]    Patient:  Leandra Gibbs  YOB: 1965  Date: 8/4/21    The patient is a 60 y.o. male who presents today for consult of the following problems:     Chief Complaint   Patient presents with    New Patient             HPI:     Leandra Gibbs is a 60 y.o. male on whom neurosurgical consultation was requested by Brent Sheffield MD for management of severe right-sided radiculopathy and axial back pain. Patient with approximately 7 to 8 months of severe axial back pain radiating down the right lower extremity approximate L5-S1 dermatomal distribution posterior aspect of the upper leg terminating approximately the popliteal fossa. Left leg symptoms distal numbness or tingling saddle anesthesia bowel or bladder dysfunction of any kind. Has been ambulatory overall. Has not had any physical therapy injections of any kind or conservative measures including anti-inflammatory pills with the exception of just a muscle relaxant. Patient states that his pain is approximate 6-7 out of 10 and exacerbated significantly when he is seated for long time or standing for long time and attempts to change positions. Currently not utilizing any home exercise or stretch regimen and does use heat intermittently but no creams or ice otherwise.       History:     Past Medical History:   Diagnosis Date    Hypertension      Past Surgical History:   Procedure Laterality Date    ANKLE SURGERY       Family History   Problem Relation Age of Onset    Other Mother         seizures    High Blood Pressure Mother     High Blood Pressure Father     High Blood Pressure Sister     High Blood Pressure Brother      Current Outpatient Medications on File Prior to Visit   Medication Sig Dispense Refill    lisinopril-hydroCHLOROthiazide (PRINZIDE;ZESTORETIC) 10-12.5 MG per tablet Take 1 tablet by mouth daily 30 tablet 5    zoster recombinant adjuvanted vaccine (SHINGRIX) 50 MCG/0.5ML SUSR injection Inject 0.5 mLs into the muscle See Admin Instructions 1 dose now and repeat in 2-6 months 0.5 mL 0    Calcium Carbonate-Vitamin D (OYSTER SHELL CALCIUM/D) 500-200 MG-UNIT TABS Take 1 tablet by mouth daily 60 tablet 0     No current facility-administered medications on file prior to visit. Social History     Tobacco Use    Smoking status: Current Every Day Smoker     Packs/day: 0.50     Years: 35.00     Pack years: 17.50     Types: Cigarettes    Smokeless tobacco: Never Used    Tobacco comment: pt states he is ready to quit. Vaping Use    Vaping Use: Never used   Substance Use Topics    Alcohol use: Not Currently     Comment: patient stopped drinking 1 year ago 4/14/21    Drug use: No       No Known Allergies    Review of Systems  Constitutional: Negative for activity change and appetite change. HENT: Negative for ear pain and facial swelling. Eyes: Negative for discharge and itching. Respiratory: Negative for choking and chest tightness. Cardiovascular: Negative for chest pain and leg swelling. Gastrointestinal: Negative for nausea and abdominal pain. Endocrine: Negative for cold intolerance and heat intolerance. Genitourinary: Negative for frequency and flank pain. Musculoskeletal: Negative for myalgias and joint swelling. Skin: Negative for rash and wound. Allergic/Immunologic: Negative for environmental allergies and food allergies. Hematological: Negative for adenopathy. Does not bruise/bleed easily. Psychiatric/Behavioral: Negative for self-injury. The patient is not nervous/anxious. Physical Exam:      BP (!) 153/86 (Site: Right Upper Arm, Position: Sitting, Cuff Size: Large Adult)   Pulse 89   Resp 16   Wt 170 lb (77.1 kg)   BMI 24.39 kg/m²   Estimated body mass index is 24.39 kg/m² as calculated from the following:    Height as of 4/22/21: 5' 10\" (1.778 m).     Weight as of this encounter: 170 lb (77.1 kg). General:  Graeme Martinez is a 60y.o. year old male who appears his stated age. HEENT: Normocephalic atraumatic. Neck supple. Chest: regular rate; pulses equal  Abdomen: Soft nontender nondistended. Normoactive bowel sounds. Ext: DP and PT pulses 2+, good cap refill  Neuro    Mentation  Appropriate affect  Registration intact  Orientation intact  3 item recall intact  Judgement intact to situation    Cranial Nerves:   Pupils equal and reactive to light  Extraocular motion intact  Face and shrug symmetric  Tongue midline  No dysarthria  v1-3 sensation symmetric, masseter tone symmetric  Hearing symmetric and intact to finger rub    Sensation:   Intact    Motor  L deltoid 5/5; R deltoid 5/5  L biceps 5/5; R biceps 5/5  L triceps 5/5; R triceps 5/5  L wrist extension 5/5; R wrist extension 5/5  L intrinsics 5/5; R intrinsics 5/5     L iliopsoas 5/5 , R iliopsoas 5/5  L quadriceps 5/5; R quadriceps 5/5  L Dorsiflexion 5/5; R dorsiflexion 5/5  L Plantarflexion 5/5; R plantarflexion 5/5  L EHL 5/5; R EHL 5/5    Reflexes  L Brachioradialis 2+/4; R brachioradialis 2+/4  L Biceps 2+/4; R Biceps 2+/4  L Triceps 2+/4; R Triceps 2+/4  L Patellar 2+/4: R Patellar 2+/4  L Achilles 2+/4; R Achilles 2+/4    hoffmans L: neg  hoffmans R: neg  Clonus L: neg  Clonus R: neg  Babinski L: up  Babinski R; up    Ellie Nim. Negative tenderness over the greater trochanters. Negative straight leg raise. Studies Review:     MRI lumbar spine with degenerative multilevel spondylosis with a protrusion central at L3-4 along with degenerative spondylosis involving the flavum and facets causing significant stenosis L3-4. Assessment and Plan:      1. Spinal stenosis of lumbar region without neurogenic claudication    2.  Other spondylosis with radiculopathy, lumbar region          Plan: Patient predominately has axial pain without any severe claudicate of symptoms including lack of lower extremity radiculopathy weakness bowel bladder dysfunction or saddle anesthesia. Considering that he has had no conservative exhaustive measures and has only axial pain at this time will strongly recommend multimodal conservative treatment. Will refer to physical therapy at this time for core strengthening stretches and postural training. In the event that he is refractory or develops worsening axial symptoms we can then consider both therapeutic and diagnostic facet blocks. We will also obtain flexion-extension x-rays to rule out any gross instability    Followup: No follow-ups on file. Prescriptions Ordered:  Orders Placed This Encounter   Medications    meloxicam (MOBIC) 15 MG tablet     Sig: Take 1 tablet by mouth daily     Dispense:  30 tablet     Refill:  0        Orders Placed:  Orders Placed This Encounter   Procedures    Ambulatory referral to Physical Therapy     Referral Priority:   Routine     Referral Type:   Eval and Treat     Referral Reason:   Specialty Services Required     Number of Visits Requested:   1        Electronically signed by Johann Thapa DO on 8/4/2021 at 11:21 AM    Please note that this chart was generated using voice recognition Dragon dictation software. Although every effort was made to ensure the accuracy of this automated transcription, some errors in transcription may have occurred.

## 2021-08-25 ENCOUNTER — HOSPITAL ENCOUNTER (OUTPATIENT)
Dept: PHYSICAL THERAPY | Age: 56
Setting detail: THERAPIES SERIES
Discharge: HOME | End: 2021-08-25
Payer: COMMERCIAL

## 2021-08-25 PROCEDURE — 97110 THERAPEUTIC EXERCISES: CPT

## 2021-08-25 PROCEDURE — 97161 PT EVAL LOW COMPLEX 20 MIN: CPT

## 2021-08-25 NOTE — CONSULTS
[x] [facility]) [facility]  [address]  [phone]  F: [phone] [] [address]  [facility]   [address]  P: [phone]  F: [phone] [] [address] [facility]  Therapy  [address]  P: [phone]  F: [phone] [] [address]  P: [phone]  F: [phone] [] [address]  [facility]   [address]   [place]: [phone]  F: [phone]      Physical Therapy Spine Evaluation    Date:  2021  Patient: Willy Chun  : 1965  MRN: [id number]  Physician: Sophia Tipton DO  Insurance: [facility]  Medical Diagnosis: Spinal stenosis of lumbar region without neurogenic claudication (M48.061 [ICD-10-CM]); Other spondylosis with radiculopathy, lumbar region (M47.26 [ICD-10-CM])    Rehab Codes: M54.5, M79.651, M25.60  Onset Date: 21  Next 's appt.:     Subjective:   CC: Intermittent back and right leg (thigh) pain; States that the pain occurs a lot when he is moving from sit to stand as well as moving to lying down; denies N&T currently, states he used to have some when symptoms first started months ago;   HPI: (onset date):  Pt reports insidious onset of back pain several months ago, states he woke up one morning with the pain in his lower back and right LE. States that he was basically bedridden for a couple of weeks due to pain but that he was finally able to get up and moving and symptoms have significantly improved.       PMHx: [] Unremarkable [] Diabetes [x] HTN  [] Pacemaker   [] MI/Heart Problems [] Cancer [] Arthritis [] Other:              [x] Refer to full medical chart  In EPIC       Comorbidities:   [] Obesity [] Dialysis  [] N/A   [] Asthma/COPD [] Dementia [] Other:   [] Stroke [] Sleep apnea [] Other:   [] Vascular disease [] Rheumatic disease [] Other:     Tests: [] X-Ray: [x] MRI: 4/22/21-  Multilevel degenerative disc disease and multilevel degenerative facet   hypertrophy with a superimposed midline disc extrusion at L3-4.  There is   canal stenosis at L3-4 and L4-5.       Severe narrowing of the L3-4 lateral recesses.       Bilateral foraminal narrowing as described above. [] Other:    Medications: [x] Refer to full medical record [] None [] Other:  Allergies:      [] Refer to full medical record [x] None [] Other:    Function:  Hand Dominance  [] Right  [] Left  Employer [employer]   Job Status [x]  Normal duty   [] Light duty   [] Off due to condition    []  Retired   [] Not employed   [] Disability  [] Other:  []  Return to work: Work activities/duties , graveyard shift, 7 days/week     Pain:  [x] Yes  [] No Location:  Pain Rating: (0-10 scale) 6/10 current, 7/10 at worst  Pain altered Tx:  [] Yes  [x] No  Action:    Symptoms:  [x] Improving [] Worsening [] Same  Better:  [] AM    [] PM    [] Sit    [] Rise/Sit    []Stand    [] Walk    [] Lying    [x] Other: stretching  Worse: [] AM    [] PM    [] Sit    [x] Rise/Sit    []Stand    [] Walk    [x] Lying    [] Bend                      [] Valsalva    [] Other:  Sleep: [x] OK    [] Disturbed    Objective:      STRENGTH  STRENGTH  ROM    Left Right  Left Right Cervical    C5 Shld Abd   L1-2 Hip Flex 5 5 Flexion    Shld Flexion   Hip Abd   Extension    Shld IR   L3-4 Knee Ext 5 5 Rotation L R   Shld ER   L4 Ankle DF 5 5 Sidebend L R   C6 Elb Flex   L5 EHL 5 5 Retraction    C7 Elb Ext   S1 Plant.  Flex 5 5 Lumbar    C8 EPL   Abdominals   Flexion 50% limited \"tight\"   T1 Fing Abd   Erector Spinae   Extension 25% limited         Rotation L 50% limited R 50% limited         Sidebend L 50% limited R 50% limited         UE/LE                                                              TESTS (+/-) LEFT RIGHT Not Tested   SLR [] sit [x] supine - - []   Hamstring (SLR) + + []   SKTC - + []   DKTC   []   Slump/Dural   []   SI JT   []   JONEL - - []   Joint Mobility   []   Cerv. Comp   []   Cerv. Distraction   []   Cerv. Alar/Transverse   []   Vertebral Artery   []   Adsons   []   Verle Dusky   []   Shantelle Tests ? Pain ? Pain No Change Not Tested   RFIS [] [] [] [x]   LYRIC [] [] [] [x]   RFIL [] [] [] [x]   REIL [] [] [] [x]   Rep Prot [] [] [] []   Rep Retract [] [] [] []       OBSERVATION No Deficit Deficit Not Tested Comments   Posture       Forward Head [] [x] []    Rounded Shoulders [] [x] []    Kyphosis [x] [] []    Lordosis [] [x] [] Decreased lumbar lordosis   Lateral Shift [x] [] []    Scoliosis [x] [] []    Iliac Crest [x] [] []    PSIS [x] [] []    ASIS [x] [] []    Leg Length Discrp [] [] [x]    Slumped Sitting [] [] []    Palpation [x] [] []    Sensation [x] [] []    Edema [x] [] []    Neurological [] [] []         Functional Test: Oswestry Score: 42% functionally impaired     Comments:    Assessment:  Patient would benefit from skilled physical therapy services in order to: address limited lumbar mobility, tightness of the hamstrings bilaterally, right hip tightness, postural impairments, core weakness, and functional impairments including difficulty moving from sit to stand. Problems:    [x] ? Pain:   [x] ? ROM:  [x] ? Strength:  [x] ? Function:  [] Other:      STG: (to be met in 8 treatments)  1. ? Pain: Decrease low back and right thigh pain to 4/10 at worst  2. ? ROM: Increase lumbar ROM to only limited by 25%   3. ? Strength: Increase core strength as demonstrated by progression of DLS exercises  4. ? Function: Improve Oswestry to 30% impairment  5. Patient to be independent with home exercise program as demonstrated by performance with correct form without cues. LTG: (to be met in 12 treatments)  1. Full, pain-free lumbar ROM  2. Pt will be able to move sit to stand without pain or difficulty  3.  Pt will be able to lift heavy weights off the floor   4. Improve Oswestry to 15% impairment      Patient goals: Pain relief. Rehab Potential:  [x] Good  [] Fair  [] Poor   Suggested Professional Referral:  [x] No  [] Yes:  Barriers to Goal Achievement:  [x] No  [] Yes:  Domestic Concerns:  [x] No  [] Yes:    Pt. Education:  [x] Plans/Goals, Risks/Benefits discussed  [x] Home exercise program--Discussed PT POC with the pt and issued handout of HEP with exercises listed below  Method of Education: [x] Verbal  [x] Demo  [x] Written  Comprehension of Education:  [x] Verbalizes understanding. [x] Demonstrates understanding. [] Needs Review. [] Demonstrates/verbalizes understanding of HEP/Ed previously given. Treatment Plan:  [x] Therapeutic Exercise   62881  [] Iontophoresis: 4 mg/mL Dexamethasone Sodium Phosphate  mAmin  29184   [] Therapeutic Activity  39953 [] Vasopneumatic cold with compression  15290    [] Gait Training   21091 [] Ultrasound   95719   [] Neuromuscular Re-education  60713 [x] Electrical Stimulation Unattended  20239   [x] Manual Therapy  75573 [] Electrical Stimulation Attended  23613   [x] Instruction in HEP  [] Lumbar/Cervical Traction  21229   [] Aquatic Therapy   47554 [x] Cold/hotpack    [] Massage   92401      [] Dry Needling, 1 or 2 muscles  21174   [] Biofeedback, first 15 minutes   88208  [] Biofeedback, additional 15 minutes   26414 [] Dry Needling, 3 or more muscles  32454     []  Medication allergies reviewed for use of    Dexamethasone Sodium Phosphate 4mg/ml     with iontophoresis treatments. Pt is not allergic.     Frequency:  2 x/week for 12 visits    Todays Treatment:  Modalities:   Precautions:  Exercises:  Exercise Reps/ Time Weight/ Level Comments   LTR 5xea 10\"    Sushil Ibarra 10\"    Iso abd draw-ins 10x                 Other:    Specific Instructions for next treatment: Per order-Core strengthening, stretches, postural training       Evaluation Complexity:  History (Personal factors, comorbidities) [x] 0 [] 1-2 [] 3+   Exam (limitations, restrictions) [x] 1-2 [] 3 [] 4+   Clinical presentation (progression) [x] Stable [] Evolving  [] Unstable   Decision Making [x] Low [] Moderate [] High    [x] Low Complexity [] Moderate Complexity [] High Complexity       Treatment Charges: Mins Units   [x] Evaluation       [x]  Low       []  Moderate       []  High 20 1   []  Modalities     [x]  Ther Exercise 10 1   []  Manual Therapy     []  Ther Activities     []  Aquatics     []  Vasocompression     []  Other       TOTAL TREATMENT TIME: 30    Time in: 1:10pm       Time out: 1:45pm    Electronically signed by: Mary Jo Ramos PT        Physician Signature:________________________________Date:__________________  By signing above or cosigning this note, I have reviewed this plan of care and certify a need for medically necessary rehabilitation services.      *PLEASE SIGN ABOVE AND FAX BACK ALL PAGES*

## 2021-12-26 DIAGNOSIS — I10 ESSENTIAL HYPERTENSION: ICD-10-CM

## 2021-12-27 DIAGNOSIS — I10 ESSENTIAL HYPERTENSION: ICD-10-CM

## 2021-12-27 RX ORDER — LISINOPRIL AND HYDROCHLOROTHIAZIDE 12.5; 1 MG/1; MG/1
1 TABLET ORAL DAILY
Qty: 30 TABLET | Refills: 5 | OUTPATIENT
Start: 2021-12-27 | End: 2022-02-25

## 2021-12-27 NOTE — TELEPHONE ENCOUNTER
----- Message from Abel Parekh sent at 12/27/2021  2:47 PM EST -----  Subject: Refill Request    QUESTIONS  Name of Medication? lisinopril-hydroCHLOROthiazide (PRINZIDE;ZESTORETIC)   10-12.5 MG per tablet  Patient-reported dosage and instructions? Take 1 tablet by mouth daily  How many days do you have left? 0  Preferred Pharmacy? [address] phone number (if available)? [phone]  ---------------------------------------------------------------------------  --------------  CALL BACK INFO  What is the best way for the office to contact you? OK to leave message on   voicemail  Preferred Call Back Phone Number?  [phone]

## 2021-12-28 RX ORDER — LISINOPRIL AND HYDROCHLOROTHIAZIDE 12.5; 1 MG/1; MG/1
TABLET ORAL
Qty: 30 TABLET | Refills: 0 | OUTPATIENT
Start: 2021-12-28

## 2021-12-28 NOTE — TELEPHONE ENCOUNTER
Request for Prinzide. Previous Dr. Juarez Hernandez pt but has an upcoming appt to establish with Dr. Myriam Soot on 1/12/22. The patient is requesting a refill for his medication to make it to this appointment.        Next Visit Date:  Future Appointments   Date Time Provider Cassius Arevalo   1/12/2022  1:00 PM Lis Kulkarni MD [address] [facility]   Topic Date Due    Hepatitis C screen  Never done    COVID-19 Vaccine (1) Never done    Pneumococcal 0-64 years Vaccine (1 of 2 - PPSV23) Never done    HIV screen  Never done    DTaP/Tdap/Td vaccine (1 - Tdap) Never done    Lipid screen  Never done    Colon cancer screen colonoscopy  Never done    Shingles Vaccine (1 of 2) Never done    Flu vaccine (1) Never done    Hepatitis A vaccine  Aged Out    Hepatitis B vaccine  Aged Out    Hib vaccine  Aged Out    Meningococcal (ACWY) vaccine  Aged Out       No results found for: LABA1C          ( goal A1C is < 7)   No results found for: LABMICR  No results found for: LDLCHOLESTEROL, LDLCALC    (goal LDL is <100)   BUN (mg/dL)   Date Value   12/03/2019 15     BP Readings from Last 3 Encounters:   08/04/21 (!) 151/85   04/22/21 (!) 165/98   04/17/21 (!) 168/97          (goal 120/80)    All Future Testing planned in [facility]  Lab Frequency Next Occurrence   Hepatitis C Antibody Once 02/08/2022   HIV-1 And HIV-2 Antibodies Once 02/08/2022   Comprehensive Metabolic Panel Once 70/66/9225   Lipid Panel Once 02/08/2022         Patient Active Problem List:     Neuropathy     Hypertension     Dyslipidemia     Tobacco use disorder     Obesity     Alcohol use

## 2022-01-12 ENCOUNTER — OFFICE VISIT (OUTPATIENT)
Dept: INTERNAL MEDICINE | Age: 57
End: 2022-01-12
Payer: COMMERCIAL

## 2022-01-12 VITALS
HEIGHT: 70 IN | HEART RATE: 73 BPM | DIASTOLIC BLOOD PRESSURE: 88 MMHG | TEMPERATURE: 98.1 F | BODY MASS INDEX: 27.92 KG/M2 | WEIGHT: 195 LBS | OXYGEN SATURATION: 97 % | SYSTOLIC BLOOD PRESSURE: 162 MMHG

## 2022-01-12 DIAGNOSIS — I10 ESSENTIAL HYPERTENSION: ICD-10-CM

## 2022-01-12 DIAGNOSIS — M53.86 SCIATICA ASSOCIATED WITH DISORDER OF LUMBAR SPINE: ICD-10-CM

## 2022-01-12 DIAGNOSIS — Z12.11 COLON CANCER SCREENING: ICD-10-CM

## 2022-01-12 PROCEDURE — 99213 OFFICE O/P EST LOW 20 MIN: CPT

## 2022-01-12 PROCEDURE — G8427 DOCREV CUR MEDS BY ELIG CLIN: HCPCS

## 2022-01-12 PROCEDURE — G8484 FLU IMMUNIZE NO ADMIN: HCPCS

## 2022-01-12 PROCEDURE — 3017F COLORECTAL CA SCREEN DOC REV: CPT

## 2022-01-12 PROCEDURE — 4004F PT TOBACCO SCREEN RCVD TLK: CPT

## 2022-01-12 PROCEDURE — G8419 CALC BMI OUT NRM PARAM NOF/U: HCPCS

## 2022-01-12 PROCEDURE — 99211 OFF/OP EST MAY X REQ PHY/QHP: CPT | Performed by: INTERNAL MEDICINE

## 2022-01-12 RX ORDER — LISINOPRIL AND HYDROCHLOROTHIAZIDE 12.5; 1 MG/1; MG/1
1 TABLET ORAL DAILY
Qty: 30 TABLET | Refills: 5 | Status: SHIPPED | OUTPATIENT
Start: 2022-01-12 | End: 2022-06-10 | Stop reason: SDUPTHER

## 2022-01-12 RX ORDER — TIZANIDINE 4 MG/1
4 TABLET ORAL EVERY 8 HOURS PRN
Qty: 30 TABLET | Refills: 1 | Status: SHIPPED | OUTPATIENT
Start: 2022-01-12 | End: 2022-04-06 | Stop reason: SDUPTHER

## 2022-01-12 RX ORDER — MEDICAL SUPPLY, MISCELLANEOUS
1 EACH MISCELLANEOUS DAILY
Qty: 1 EACH | Refills: 0 | Status: SHIPPED | OUTPATIENT
Start: 2022-01-12 | End: 2022-07-27

## 2022-01-12 RX ORDER — IBUPROFEN 600 MG/1
600 TABLET ORAL EVERY 8 HOURS PRN
Qty: 90 TABLET | Refills: 0 | Status: SHIPPED | OUTPATIENT
Start: 2022-01-12 | End: 2022-06-10 | Stop reason: SDUPTHER

## 2022-01-12 ASSESSMENT — ENCOUNTER SYMPTOMS
RHINORRHEA: 0
CHEST TIGHTNESS: 0
DIARRHEA: 0
BACK PAIN: 1
ABDOMINAL DISTENTION: 0
BLOOD IN STOOL: 0
COUGH: 0
WHEEZING: 0
SHORTNESS OF BREATH: 0
COLOR CHANGE: 0
APNEA: 0
CONSTIPATION: 0
ABDOMINAL PAIN: 0

## 2022-01-12 NOTE — PROGRESS NOTES
Attending Physician Statement  I have discussed the care of Nancy Walden,  including pertinent history and exam findings,  with the resident. I have seen and examined the patient and the key elements of all parts of the encounter have been performed by me. I agree with the assessment, plan and orders as documented by the resident. (GC Modifier)    Chronic back pain, sciatica and lumbar stenosis with intermittent neurogenic claudication : improved from last year.  He does not take any medication for this and states that none of the medications owrk for him     HTN : refilled his prinzide , monitor bp at home    Screening and preventive health check up was refused by the patient except for colonoscopy on account of altered bowel habits, night sweats and intermittent flare up of hemorrhoids  + active smoker , no weight loss           MD JOHN Eller  Attending Physician, [facility]   Faculty, Internal Medicine Residency Program  [address] [facility]  1/12/2022, 1:51 PM

## 2022-01-12 NOTE — PROGRESS NOTES
[facility]  [facility] [address] [address] [facility] [address]  Dept: [phone]  Dept Fax: [phone]    Office Progress/Follow Up Note  Date ofpatient's visit: 1/12/2022  Patient's Name:  Jossie Correa YOB: 1965            Patient Care Team:  Alvin Jones MD as PCP - General (Internal Medicine)  ================================================================    REASON FOR VISIT/CHIEF COMPLAINT:  Establish Care (HTN, needs refill ), Hemorrhoids, Back Pain, and Health Maintenance (declines vaccines, colonoscopy pended)    HISTORY OF PRESENTING ILLNESS:  History was obtained from: patient, electronic medical record. Zandra landry 60 y.o. is here for follow-up for his high blood pressure and medication refill. Patient has history of hypertension taking lisinopril HCTZ 10-12.5 daily but patient did not take his medication for the last 2 weeks because he ran out of it. His blood pressure today at today's visit is high, 162/88. Patient has history of lumbar radiculopathy taking meloxicam.  Currently complaining of back pain. He ran out of his pain meds. Patient reported diaphoresis (upper chest) overnight for the last 1 month but denies any chest pain, shortness of breath, weight loss, fevers headache,  cough, contact with sick people around him, abdominal pain, dysuria. He does has history of ID bleed with constipation several years ago. Denies any ID bleeding/pain now. Would like a referral for colonoscopy. Patient has smoking history, 1/2 PPD for the last 40 years. Denies low-dose chest CT for CT lung screening. Smoking cessation counseling done. Denies vaccination for COVID. Denies Hep C/HIV screen, HbA1c or lipid profile at this visit. We have talked to him in detail regarding the importance of screening for diabetes, early diagnosis and prevention of endorgan damage.   Also discussed with him importance of lipid screen and its impact on different organs. .     Diagnosis Orders   1. Essential hypertension  lisinopril-hydroCHLOROthiazide (PRINZIDE;ZESTORETIC) 10-12.5 MG per tablet    Basic Metabolic Panel   2. Sciatica associated with disorder of lumbar spine     3. Colon cancer screening  [facility] Scre[facility] Colonoscopy      Patient Active Problem List   Diagnosis    Colon cancer screening    Neuropathy    Hypertension    Dyslipidemia    Tobacco use disorder    Obesity    Alcohol use    Sciatica associated with disorder of lumbar spine       Health Maintenance Due   Topic Date Due    Hepatitis C screen  Never done    COVID-19 Vaccine (1) Never done    Pneumococcal 0-64 years Vaccine (1 of 2 - PPSV23) Never done    HIV screen  Never done    DTaP/Tdap/Td vaccine (1 - Tdap) Never done    Lipid screen  Never done    Colon cancer screen colonoscopy  Never done    Shingles Vaccine (1 of 2) Never done    Flu vaccine (1) Never done       No Known Allergies      Current Outpatient Medications   Medication Sig Dispense Refill    lisinopril-hydroCHLOROthiazide (PRINZIDE;ZESTORETIC) 10-12.5 MG per tablet Take 1 tablet by mouth daily 30 tablet 5    tiZANidine (ZANAFLEX) 4 MG tablet Take 1 tablet by mouth every 8 hours as needed (sciatica) 30 tablet 1    ibuprofen (ADVIL;MOTRIN) 600 MG tablet Take 1 tablet by mouth every 8 hours as needed for Pain 90 tablet 0    Blood Pressure Monitoring (B-D ASSURE BPM/AUTO ARM CUFF) MISC 1 each by Does not apply route daily 1 each 0     No current facility-administered medications for this visit. Social History     Tobacco Use    Smoking status: Current Every Day Smoker     Packs/day: 0.50     Years: 35.00     Pack years: 17.50     Types: Cigarettes    Smokeless tobacco: Never Used    Tobacco comment: pt states he is ready to quit.     Vaping Use    Vaping Use: Never used   Substance Use Topics    Alcohol use: Not Currently     Comment: patient stopped drinking 1 year ago 4/14/21    Drug use: No       Family History   Problem Relation Age of Onset    Other Mother         seizures    High Blood Pressure Mother     High Blood Pressure Father     High Blood Pressure Sister     High Blood Pressure Brother         REVIEW OF SYSTEMS:  Review of Systems   Constitutional: Negative for activity change, appetite change, diaphoresis, fatigue and fever. HENT: Negative for congestion, ear discharge, rhinorrhea and sneezing. Respiratory: Negative for apnea, cough, chest tightness, shortness of breath and wheezing. Cardiovascular: Negative for chest pain, palpitations and leg swelling. Gastrointestinal: Negative for abdominal distention, abdominal pain, blood in stool, constipation and diarrhea. Genitourinary: Negative for difficulty urinating, dysuria, enuresis, frequency and urgency. Musculoskeletal: Positive for back pain. Negative for gait problem, joint swelling, neck pain and neck stiffness. Skin: Negative for color change, pallor and rash. Neurological: Negative for dizziness, tremors, light-headedness, numbness and headaches. Psychiatric/Behavioral: Negative for agitation, confusion, self-injury and sleep disturbance. PHYSICAL EXAM:  Vitals:    01/12/22 1257 01/12/22 1302   BP: (!) 175/94 (!) 162/88   Site: Left Upper Arm Left Upper Arm   Position: Sitting Sitting   Cuff Size: Large Adult Large Adult   Pulse: 76 73   Temp: 98.1 °F (36.7 °C)    SpO2: 97%    Weight: 195 lb (88.5 kg)    Height: 5' 10\" (1.778 m)      BP Readings from Last 3 Encounters:   01/12/22 (!) 162/88   08/04/21 (!) 151/85   04/22/21 (!) 165/98        Physical Exam  Constitutional:       General: He is not in acute distress. Appearance: He is not ill-appearing or toxic-appearing. Cardiovascular:      Rate and Rhythm: Normal rate and regular rhythm. Heart sounds: No murmur heard. No friction rub. Pulmonary:      Effort: No respiratory distress. Breath sounds:  No wheezing, rhonchi or rales.   Abdominal:      General: There is no distension. Palpations: There is no mass. Tenderness: There is no abdominal tenderness. There is no guarding. Hernia: No hernia is present. Musculoskeletal:         General: No swelling or tenderness. Right lower leg: No edema. Left lower leg: No edema. Skin:     Coloration: Skin is not jaundiced or pale. Findings: No bruising or erythema. Neurological:      General: No focal deficit present. Mental Status: He is oriented to person, place, and time. Cranial Nerves: No cranial nerve deficit. Sensory: No sensory deficit. Motor: No weakness. Coordination: Coordination normal.           DIAGNOSTIC FINDINGS:  CBC:  Lab Results   Component Value Date    WBC 8.9 12/03/2019    HGB 12.9 12/03/2019    PLT See Reflexed IPF Result 12/03/2019       BMP:    Lab Results   Component Value Date     12/03/2019    K 3.4 12/03/2019     12/03/2019    CO2 17 12/03/2019    BUN 15 12/03/2019    CREATININE 0.88 12/03/2019    GLUCOSE 93 12/03/2019       HEMOGLOBIN A1C: No results found for: LABA1C    FASTING LIPID PANEL:No results found for: CHOL, HDL, TRIG    ASSESSMENT AND PLAN:  Maddy Handy was seen today for establish care, hemorrhoids, back pain and health maintenance. Diagnoses and all orders for this visit:    Essential hypertension  -     lisinopril-hydroCHLOROthiazide (PRINZIDE;ZESTORETIC) 10-12.5 MG per tablet; Take 1 tablet by mouth daily  -     Basic Metabolic Panel; Future    Sciatica associated with disorder of lumbar spine.  -Zanaflex 4q8 PRN  -Motrin 6q8 PRN  -Counseled regarding exercise      Colon cancer screening  -     [facility] Screening Colonoscopy    Other orders  -     tiZANidine (ZANAFLEX) 4 MG tablet; Take 1 tablet by mouth every 8 hours as needed (sciatica)  -     ibuprofen (ADVIL;MOTRIN) 600 MG tablet;  Take 1 tablet by mouth every 8 hours as needed for Pain         FOLLOW UP AND INSTRUCTIONS:  Return in about 3 months (around 4/12/2022). · Jeannie Rowland received counseling on the following healthy behaviors: exercise, medication adherence, tobacco cessation and decrease in alcohol consumption    · Discussed use, benefit, and side effects of prescribed medications. Barriers to medication compliance addressed. All patient questions answered. Pt voiced understanding. · Patient given educational materials - see patient instructions    Rodney Lorenzo MD  Resident Internal Medicine  [facility],  [facility]. 1/12/2022, 1:58 PM    This note is created with the assistance of a speech-recognition program. While intending to generate a document that actually reflects the content of thevisit, the document can still have some mistakes which may not have been identified and corrected by editing.

## 2022-01-12 NOTE — PATIENT INSTRUCTIONS
A printed script for Durable Medical Equipment was ordered for the patient. The script was faxed to [facility] MARY Francesca, [address] [place] [address]  P: [phone] F: [phone] . An order for Colonoscopy was placed by your physician. [facility] will be contacting you to schedule this procedure. Please contact [facility] at [phone] if you have not heard from scheduling within 1 week. Medications e-scribe to pharmacy of pt's choice. Laboratory Instructions: Your doctor has ordered blood or urine testing. You can get this testing done at the Lab located on the first floor of the [facility], or at any other [facility]. Please stop at Main Registration, before going to the lab, as you must be registered first.   Please get this lab done before your next visit. You may eat or drink before this test.  Labs given to patient    Patient was put on a wait list for 3 months and will be contacted to schedule their next follow up appointment once the schedule is available. If the patient is in need of an appointment before their next visit please call the office at [phone]. After Visit Summary  given and reviewed.

## 2022-01-13 ENCOUNTER — TELEPHONE (OUTPATIENT)
Dept: SURGERY | Age: 57
End: 2022-01-13

## 2022-01-13 NOTE — TELEPHONE ENCOUNTER
1/13/2022- Spoke to patient, surgery scheduled at [facility]. Patient confirmed and information mailed to patient.      Surgery date/time: 2/25/2022 at 11:00am  Arrival time: 9:30am  Prep appt: 2/15/22 at 3:30pm  Covid test appt: 2/21/2022 at 3:50pm

## 2022-02-07 ENCOUNTER — TELEPHONE (OUTPATIENT)
Dept: SURGERY | Age: 57
End: 2022-02-07

## 2022-02-07 RX ORDER — POLYETHYLENE GLYCOL 3350, SODIUM SULFATE ANHYDROUS, SODIUM BICARBONATE, SODIUM CHLORIDE, POTASSIUM CHLORIDE 236; 22.74; 6.74; 5.86; 2.97 G/4L; G/4L; G/4L; G/4L; G/4L
POWDER, FOR SOLUTION ORAL
Qty: 4000 ML | Refills: 0 | Status: SHIPPED | OUTPATIENT
Start: 2022-02-07 | End: 2022-02-15

## 2022-02-15 ENCOUNTER — TELEPHONE (OUTPATIENT)
Dept: INTERNAL MEDICINE | Age: 57
End: 2022-02-15

## 2022-02-15 RX ORDER — POLYETHYLENE GLYCOL 3350 17 G/17G
17 POWDER, FOR SOLUTION ORAL DAILY
Qty: 1530 G | Refills: 1 | Status: ON HOLD | OUTPATIENT
Start: 2022-02-15 | End: 2022-02-25 | Stop reason: HOSPADM

## 2022-02-15 NOTE — TELEPHONE ENCOUNTER
Pt. Just received a letter from his insurance company stating they don't cover ( Golytely ) Solution . Is there another medication for this.    Please advise

## 2022-02-16 ENCOUNTER — TELEPHONE (OUTPATIENT)
Dept: SURGERY | Age: 57
End: 2022-02-16

## 2022-02-16 NOTE — TELEPHONE ENCOUNTER
2/16/2022- Patient missed prep appt. LVM for patient to call back to review instructions. 2/16/2022- Spoke to patient, reviewed bowel prep and colonoscopy instructions. All questions answered and patient requested instructions be emailed to him. [email or web address]

## 2022-02-21 ENCOUNTER — TELEPHONE (OUTPATIENT)
Dept: FAMILY MEDICINE CLINIC | Age: 57
End: 2022-02-21

## 2022-02-21 ENCOUNTER — HOSPITAL ENCOUNTER (OUTPATIENT)
Dept: LAB | Age: 57
Setting detail: SPECIMEN
Discharge: HOME | End: 2022-02-21
Payer: COMMERCIAL

## 2022-02-21 DIAGNOSIS — Z01.818 PREOP TESTING: Primary | ICD-10-CM

## 2022-02-21 PROCEDURE — U0005 INFEC AGEN DETEC AMPLI PROBE: HCPCS

## 2022-02-21 PROCEDURE — U0003 INFECTIOUS AGENT DETECTION BY NUCLEIC ACID (DNA OR RNA); SEVERE ACUTE RESPIRATORY SYNDROME CORONAVIRUS 2 (SARS-COV-2) (CORONAVIRUS DISEASE [COVID-19]), AMPLIFIED PROBE TECHNIQUE, MAKING USE OF HIGH THROUGHPUT TECHNOLOGIES AS DESCRIBED BY CMS-2020-01-R: HCPCS

## 2022-02-21 NOTE — TELEPHONE ENCOUNTER
2/21/2022- Spoke to patient, he requested bowel prep be emailed to his girlfriends email. [email or web address]. Writer emailed bowel prep.

## 2022-02-22 ENCOUNTER — ANESTHESIA EVENT (OUTPATIENT)
Dept: OPERATING ROOM | Age: 57
End: 2022-02-22
Payer: COMMERCIAL

## 2022-02-22 LAB
SARS-COV-2: NORMAL
SARS-COV-2: NOT DETECTED
SOURCE: NORMAL

## 2022-02-25 ENCOUNTER — ANESTHESIA (OUTPATIENT)
Dept: OPERATING ROOM | Age: 57
End: 2022-02-25
Payer: COMMERCIAL

## 2022-02-25 ENCOUNTER — HOSPITAL ENCOUNTER (OUTPATIENT)
Age: 57
Setting detail: OUTPATIENT SURGERY
Discharge: HOME | End: 2022-02-25
Attending: SURGERY | Admitting: SURGERY
Payer: COMMERCIAL

## 2022-02-25 VITALS
DIASTOLIC BLOOD PRESSURE: 65 MMHG | OXYGEN SATURATION: 99 % | RESPIRATION RATE: 23 BRPM | SYSTOLIC BLOOD PRESSURE: 120 MMHG

## 2022-02-25 VITALS
OXYGEN SATURATION: 96 % | RESPIRATION RATE: 15 BRPM | HEIGHT: 70 IN | WEIGHT: 193.8 LBS | BODY MASS INDEX: 27.75 KG/M2 | DIASTOLIC BLOOD PRESSURE: 80 MMHG | SYSTOLIC BLOOD PRESSURE: 121 MMHG | HEART RATE: 61 BPM | TEMPERATURE: 97.9 F

## 2022-02-25 PROCEDURE — 2500000003 HC RX 250 WO HCPCS: Performed by: SPECIALIST

## 2022-02-25 PROCEDURE — 7100000011 HC PHASE II RECOVERY - ADDTL 15 MIN: Performed by: SURGERY

## 2022-02-25 PROCEDURE — 45385 COLONOSCOPY W/LESION REMOVAL: CPT | Performed by: SURGERY

## 2022-02-25 PROCEDURE — 3700000000 HC ANESTHESIA ATTENDED CARE: Performed by: SURGERY

## 2022-02-25 PROCEDURE — 3700000001 HC ADD 15 MINUTES (ANESTHESIA): Performed by: SURGERY

## 2022-02-25 PROCEDURE — 7100000010 HC PHASE II RECOVERY - FIRST 15 MIN: Performed by: SURGERY

## 2022-02-25 PROCEDURE — 2709999900 HC NON-CHARGEABLE SUPPLY: Performed by: SURGERY

## 2022-02-25 PROCEDURE — 3609010400 HC COLONOSCOPY POLYPECTOMY HOT BIOPSY: Performed by: SURGERY

## 2022-02-25 PROCEDURE — 6360000002 HC RX W HCPCS: Performed by: SPECIALIST

## 2022-02-25 PROCEDURE — 2580000003 HC RX 258: Performed by: ANESTHESIOLOGY

## 2022-02-25 PROCEDURE — 88305 TISSUE EXAM BY PATHOLOGIST: CPT

## 2022-02-25 RX ORDER — ONDANSETRON 2 MG/ML
4 INJECTION INTRAMUSCULAR; INTRAVENOUS
Status: DISCONTINUED | OUTPATIENT
Start: 2022-02-25 | End: 2022-02-25 | Stop reason: HOSPADM

## 2022-02-25 RX ORDER — LIDOCAINE HYDROCHLORIDE 10 MG/ML
INJECTION, SOLUTION EPIDURAL; INFILTRATION; INTRACAUDAL; PERINEURAL PRN
Status: DISCONTINUED | OUTPATIENT
Start: 2022-02-25 | End: 2022-02-25 | Stop reason: SDUPTHER

## 2022-02-25 RX ORDER — SODIUM CHLORIDE 0.9 % (FLUSH) 0.9 %
5-40 SYRINGE (ML) INJECTION EVERY 12 HOURS SCHEDULED
Status: DISCONTINUED | OUTPATIENT
Start: 2022-02-25 | End: 2022-02-25 | Stop reason: HOSPADM

## 2022-02-25 RX ORDER — MEPERIDINE HYDROCHLORIDE 50 MG/ML
12.5 INJECTION INTRAMUSCULAR; INTRAVENOUS; SUBCUTANEOUS EVERY 5 MIN PRN
Status: DISCONTINUED | OUTPATIENT
Start: 2022-02-25 | End: 2022-02-25 | Stop reason: HOSPADM

## 2022-02-25 RX ORDER — SODIUM CHLORIDE 9 MG/ML
25 INJECTION, SOLUTION INTRAVENOUS PRN
Status: DISCONTINUED | OUTPATIENT
Start: 2022-02-25 | End: 2022-02-25 | Stop reason: HOSPADM

## 2022-02-25 RX ORDER — DIPHENHYDRAMINE HYDROCHLORIDE 50 MG/ML
12.5 INJECTION INTRAMUSCULAR; INTRAVENOUS
Status: DISCONTINUED | OUTPATIENT
Start: 2022-02-25 | End: 2022-02-25 | Stop reason: HOSPADM

## 2022-02-25 RX ORDER — PROPOFOL 10 MG/ML
INJECTION, EMULSION INTRAVENOUS CONTINUOUS PRN
Status: DISCONTINUED | OUTPATIENT
Start: 2022-02-25 | End: 2022-02-25 | Stop reason: SDUPTHER

## 2022-02-25 RX ORDER — MORPHINE SULFATE 1 MG/ML
1 INJECTION, SOLUTION EPIDURAL; INTRATHECAL; INTRAVENOUS EVERY 5 MIN PRN
Status: DISCONTINUED | OUTPATIENT
Start: 2022-02-25 | End: 2022-02-25 | Stop reason: HOSPADM

## 2022-02-25 RX ORDER — PROPOFOL 10 MG/ML
INJECTION, EMULSION INTRAVENOUS PRN
Status: DISCONTINUED | OUTPATIENT
Start: 2022-02-25 | End: 2022-02-25 | Stop reason: SDUPTHER

## 2022-02-25 RX ORDER — SODIUM CHLORIDE 0.9 % (FLUSH) 0.9 %
10 SYRINGE (ML) INJECTION PRN
Status: DISCONTINUED | OUTPATIENT
Start: 2022-02-25 | End: 2022-02-25 | Stop reason: HOSPADM

## 2022-02-25 RX ORDER — SODIUM CHLORIDE 0.9 % (FLUSH) 0.9 %
5-40 SYRINGE (ML) INJECTION PRN
Status: DISCONTINUED | OUTPATIENT
Start: 2022-02-25 | End: 2022-02-25 | Stop reason: HOSPADM

## 2022-02-25 RX ORDER — SODIUM CHLORIDE, SODIUM LACTATE, POTASSIUM CHLORIDE, CALCIUM CHLORIDE 600; 310; 30; 20 MG/100ML; MG/100ML; MG/100ML; MG/100ML
INJECTION, SOLUTION INTRAVENOUS CONTINUOUS
Status: DISCONTINUED | OUTPATIENT
Start: 2022-02-25 | End: 2022-02-25 | Stop reason: HOSPADM

## 2022-02-25 RX ORDER — SODIUM CHLORIDE 9 MG/ML
INJECTION, SOLUTION INTRAVENOUS CONTINUOUS
Status: DISCONTINUED | OUTPATIENT
Start: 2022-02-25 | End: 2022-02-25 | Stop reason: HOSPADM

## 2022-02-25 RX ORDER — SODIUM CHLORIDE 0.9 % (FLUSH) 0.9 %
10 SYRINGE (ML) INJECTION EVERY 12 HOURS SCHEDULED
Status: DISCONTINUED | OUTPATIENT
Start: 2022-02-25 | End: 2022-02-25 | Stop reason: HOSPADM

## 2022-02-25 RX ADMIN — PROPOFOL 100 MG: 10 INJECTION, EMULSION INTRAVENOUS at 08:47

## 2022-02-25 RX ADMIN — PROPOFOL 125 MCG/KG/MIN: 10 INJECTION, EMULSION INTRAVENOUS at 08:47

## 2022-02-25 RX ADMIN — SODIUM CHLORIDE, POTASSIUM CHLORIDE, SODIUM LACTATE AND CALCIUM CHLORIDE: 600; 310; 30; 20 INJECTION, SOLUTION INTRAVENOUS at 08:42

## 2022-02-25 RX ADMIN — LIDOCAINE HYDROCHLORIDE 50 MG: 10 INJECTION, SOLUTION EPIDURAL; INFILTRATION; INTRACAUDAL; PERINEURAL at 08:47

## 2022-02-25 ASSESSMENT — LIFESTYLE VARIABLES: SMOKING_STATUS: 1

## 2022-02-25 ASSESSMENT — PULMONARY FUNCTION TESTS
PIF_VALUE: 1
PIF_VALUE: 0
PIF_VALUE: 1
PIF_VALUE: 1
PIF_VALUE: 0

## 2022-02-25 ASSESSMENT — PAIN SCALES - GENERAL
PAINLEVEL_OUTOF10: 0

## 2022-02-25 ASSESSMENT — PAIN - FUNCTIONAL ASSESSMENT: PAIN_FUNCTIONAL_ASSESSMENT: 0-10

## 2022-02-25 NOTE — ANESTHESIA PRE PROCEDURE
Department of Anesthesiology  Preprocedure Note       Name:  Debra Means   Age:  60 y.o.  :  1965                                          MRN:  [id number]         Date:  2022      Surgeon: Liseth Villanueva):  Radha Aguirre DO    Procedure: Procedure(s):  COLORECTAL CANCER SCREENING, NOT HIGH RISK    Medications prior to admission:   Prior to Admission medications    Medication Sig Start Date End Date Taking?  Authorizing Provider   lisinopril-hydroCHLOROthiazide (PRINZIDE;ZESTORETIC) 10-12.5 MG per tablet Take 1 tablet by mouth daily 1/12/22 3/13/22  Jayne Judd MD   tiZANidine (ZANAFLEX) 4 MG tablet Take 1 tablet by mouth every 8 hours as needed (sciatica) 22   Jayne Judd MD   ibuprofen (ADVIL;MOTRIN) 600 MG tablet Take 1 tablet by mouth every 8 hours as needed for Pain 22   Jayne Judd MD   Blood Pressure Monitoring (B-D ASSURE BPM/AUTO ARM CUFF) MISC 1 each by Does not apply route daily 22   Jayne Judd MD       Current medications:    Current Facility-Administered Medications   Medication Dose Route Frequency Provider Last Rate Last Admin    0.9 % sodium chloride infusion   IntraVENous Continuous Alex Vela MD        lactated ringers infusion   IntraVENous Continuous Alex Vela MD        sodium chloride flush 0.9 % injection 10 mL  10 mL IntraVENous 2 times per day Alex Vela MD        sodium chloride flush 0.9 % injection 10 mL  10 mL IntraVENous PRN Alex Vela MD        0.9 % sodium chloride infusion  25 mL IntraVENous PRN Alex Vela MD           Allergies:  No Known Allergies    Problem List:    Patient Active Problem List   Diagnosis Code    Neuropathy G62.9    Hypertension I10    Dyslipidemia E78.5    Tobacco use disorder F17.200    Obesity E66.9    Alcohol use Z78.9    Sciatica associated with disorder of lumbar spine M53.86       Past Medical History:        Diagnosis Date    Hypertension        Past Surgical History:        Procedure Laterality Date    ANKLE SURGERY         Social History:    Social History     Tobacco Use    Smoking status: Current Every Day Smoker     Packs/day: 0.50     Years: 35.00     Pack years: 17.50     Types: Cigarettes    Smokeless tobacco: Never Used    Tobacco comment: pt states he is ready to quit. Substance Use Topics    Alcohol use: Not Currently     Comment: patient stopped drinking 1 year ago 4/14/21                                Ready to quit: Not Answered  Counseling given: Not Answered  Comment: pt states he is ready to quit. Vital Signs (Current):   Vitals:    02/25/22 0824   Temp: 97.3 °F (36.3 °C)   TempSrc: Infrared                                              BP Readings from Last 3 Encounters:   01/12/22 (!) 162/88   08/04/21 (!) 151/85   04/22/21 (!) 165/98       NPO Status:                                                                                 BMI:   Wt Readings from Last 3 Encounters:   01/12/22 195 lb (88.5 kg)   08/04/21 170 lb (77.1 kg)   04/22/21 170 lb 12.8 oz (77.5 kg)     There is no height or weight on file to calculate BMI.    CBC:   Lab Results   Component Value Date    WBC 8.9 12/03/2019    RBC 4.59 12/03/2019    HGB 12.9 12/03/2019    HCT 38.5 12/03/2019    MCV 83.9 12/03/2019    RDW 16.2 12/03/2019    PLT See Reflexed IPF Result 12/03/2019       CMP:   Lab Results   Component Value Date     12/03/2019    K 3.4 12/03/2019     12/03/2019    CO2 17 12/03/2019    BUN 15 12/03/2019    CREATININE 0.88 12/03/2019    GFRAA >60 12/03/2019    LABGLOM >60 12/03/2019    GLUCOSE 93 12/03/2019    CALCIUM 9.0 12/03/2019       POC Tests: No results for input(s): POCGLU, POCNA, POCK, POCCL, POCBUN, POCHEMO, POCHCT in the last 72 hours.     Coags: No results found for: PROTIME, INR, APTT    HCG (If Applicable): No results found for: PREGTESTUR, PREGSERUM, HCG, HCGQUANT     ABGs: No results found for: PHART, PO2ART, VNS9CGY, BWT0RNX, BEART, G1VNXMAS     Type & Screen (If Applicable):  No results found for: Jack Gracia    Drug/Infectious Status (If Applicable):  No results found for: HIV, HEPCAB    COVID-19 Screening (If Applicable):   Lab Results   Component Value Date    COVID19 Not Detected 02/21/2022           Anesthesia Evaluation  Patient summary reviewed and Nursing notes reviewed no history of anesthetic complications:   Airway: Mallampati: II  TM distance: >3 FB   Neck ROM: full  Mouth opening: > = 3 FB Dental: normal exam         Pulmonary:normal exam  breath sounds clear to auscultation  (+) current smoker                           Cardiovascular:    (+) hypertension: no interval change,         Rhythm: regular  Rate: normal                    Neuro/Psych:   (+) neuromuscular disease:,             GI/Hepatic/Renal:   (+) bowel prep,           Endo/Other: Negative Endo/Other ROS                    Abdominal:       Abdomen: soft. Vascular: negative vascular ROS. Other Findings:             Anesthesia Plan      MAC     ASA 2             Anesthetic plan and risks discussed with patient. Plan discussed with CRNA.                   Vivien Youssef MD   2/25/2022

## 2022-02-25 NOTE — H&P
[address]      Patient's Name/ Date of Birth/ Gender: Lety Reddy / 1965 (60 y.o.) / male     Attending physician: Cornell Benitez DO    CC: colorectal cancer screening    History of present Illness: Lety Reddy is a 60 y.o. male, presents today for colonoscopy for colorectal cancer screening. Colonoscopy history: No prior cscope. Past Medical History:  has a past medical history of Hypertension. Past Surgical History:   Past Surgical History:   Procedure Laterality Date    ANKLE SURGERY         Social History:  reports that he has been smoking cigarettes. He has a 17.50 pack-year smoking history. He has never used smokeless tobacco. He reports previous alcohol use. He reports that he does not use drugs. Family History: family history includes High Blood Pressure in his brother, father, mother, and sister; Other in his mother. Review of Systems:   General: Denies fever, chills, night sweats, weight loss, malaise, fatigue  HEENT: Denies sore throat, sinus problems, allergic rhinosinusitis  Card: Denies chest pain, palpitations, orthopnea/PND. Denies h/o murmurs  Pulm: Denies cough, shortness of breath, OROZCO  GI:  per HPI; denies history of constipation, diarrhea, hematochezia or melena  : Denies polyuria, dysuria, hematuria  Endo: Denies diabetes, thyroid problems. Heme: Denies anemia, h/o bleeding or clotting problems. Neuro: Denies h/o CVA, TIA  Skin: Denies rashes, ulcers  Musculoskeletal: Denies muscle, joint, back pain. Allergies: Patient has no known allergies. Current Meds:No current facility-administered medications for this encounter.     Physical Exam:  Vital signs and Nurse's note reviewed  Gen:  A&Ox3, NAD  HEENT: NCAT, PERRLA, EOMI, no scleral icterus, oral mucosa moist  Neck: Trachea midline without obvious masses or lesions  Chest: Symmetric rise with inhalation, no evidence of trauma  CVS: S1S2  Resp: Good bilateral air entry, no audible wheeze or rhonchi  Abd: soft, non-tender, non-distended, no hepatosplenomegaly or palpable masses  Ext: No clubbing, cyanosis, edema, peripheral pulses 2+ Rad/Fem/DP/PT  CNS: Moves all extremities, no gross focal motor deficits  Skin: No erythema or ulcerations         Assessment:    Ye Beaza is a 60 y.o. male presents for colonoscopy for colorectal screening     Plan:    1. To OR for colonoscopy. The risks include bleeding, infection, scarring, perforation, damage to surrounding tissues, need for further surgery in the future. The benefits, alternatives, complications and procedure details were explained to the patient, all questions were answered.           Leann Uribe, DO  2/25/2022

## 2022-02-25 NOTE — ANESTHESIA POSTPROCEDURE EVALUATION
POST- ANESTHESIA EVALUATION       Pt Name: Salinas Julian  MRN: [id number]  [place]: 1965  Date of evaluation: 2/25/2022  Time:  9:58 AM      /80   Pulse 61   Temp 97.9 °F (36.6 °C) (Temporal)   Resp 15   Ht 5' 10\" (1.778 m)   Wt 193 lb 12.8 oz (87.9 kg)   SpO2 96%   BMI 27.81 kg/m²      Consciousness Level  Awake  Cardiopulmonary Status  Stable  Pain Adequately Treated YES  Nausea / Vomiting  NO  Adequate Hydration  YES  Anesthesia Related Complications NONE      Electronically signed by Donal Tejeda MD on 2/25/2022 at 9:58 AM       Department of Anesthesiology  Postprocedure Note    Patient: Salinas Julian  MRN: [id number]  YOB: 1965  Date of evaluation: 2/25/2022  Time:  9:58 AM     Procedure Summary     Date: 02/25/22 Room / Location: [address]    Anesthesia Start: 9835 Anesthesia Stop: 3245    Procedure: COLONOSCOPY POLYPECTOMY HOT BIOPSY (N/A Anus) Diagnosis: (Z12.11  SCREENING)    Surgeons: Delia Cole DO Responsible Provider: Donal Tejeda MD    Anesthesia Type: MAC ASA Status: 2          Anesthesia Type: MAC    Elda Phase I: Elda Score: 10    Elda Phase II: Elda Score: 10    Last vitals: Reviewed and per EMR flowsheets.        Anesthesia Post Evaluation

## 2022-02-25 NOTE — OP NOTE
Operative Note      Patient: Italia Taveras  YOB: 1965  MRN: [id number]    Date of Procedure: 2/25/2022    Pre-Op Diagnosis: Z12.11  SCREENING    Post-Op Diagnosis:   Pedunculated sigmoid colon polyp 6mm       Procedure(s):  COLONOSCOPY POLYPECTOMY HOT BIOPSY    Surgeon(s):  Binu Walls DO    Assistant:   * No surgical staff found *    Anesthesia: Monitor Anesthesia Care    Estimated Blood Loss (mL): Minimal    Complications: None    Specimens:   ID Type Source Tests Collected by Time Destination   A : sigmoid polyp Tissue Tissue SURGICAL PATHOLOGY Binu Walls DO 2/25/2022 0859        Implants:  * No implants in log *      Drains: * No LDAs found *    Findings: as above    Detailed Description of Procedure:       INDICATIONS FOR PROCEDURE:   The patient is a 60y.o. year old male with history of above preop diagnosis. Colonoscopy with possible biopsy or polypectomy was recommend, the risk, benefits, expected outcome, and alternatives to the procedure were explained. Risks included but are not limited to bleeding, infection, respiratory distress, hypotension, and perforation of the colon. The patient understands and is in agreement. PROCEDURE DETAILS:  Patient was brought to the endoscopy suite and placed in the left lateral recumbent position. A time out was completed verifying correct patient, procedure and equipment. Anesthesia was induced using MAC guidelines. A digital rectal exam was performed. The colonoscope was inserted into the rectum without difficulty and the scope was advanced to the cecum which was identified by anatomy and by palpation. Bowel prep was adequate. The scope was slowly withdrawn visualizing the cecum, ascending colon, transverse colon, descending colon, sigmoid colon and rectum. The scope was withdrawn to the rectal vault and then retroflexed. The scope was then straightened and removed.  The patient tolerated the procedure well and was transferred to the recovery unit in stable condition. Findings:    Polyps:    Pedunculated sigmoid colon polyp removed with hot snare at its base with excellent hemostasis    Other findings:    Internal hemorrhoids      Greater than 6 minutes was spent withdrawing the colonoscope. IMPRESSION/PLAN:   1. Increase dietary fiber and water  2. Patient is advised to have a repeat colonoscopy in 5-7 years   3.  Colonoscopy sooner if blood in the stool, unexplained weight loss, or change in the bowels          Electronically signed by Cornell Benitez DO on 2/25/2022 at 2:20 PM

## 2022-02-28 LAB — SURGICAL PATHOLOGY REPORT: NORMAL

## 2022-04-06 RX ORDER — TIZANIDINE 4 MG/1
4 TABLET ORAL EVERY 8 HOURS PRN
Qty: 30 TABLET | Refills: 1 | Status: SHIPPED | OUTPATIENT
Start: 2022-04-06 | End: 2022-06-10 | Stop reason: SDUPTHER

## 2022-04-06 NOTE — TELEPHONE ENCOUNTER
Pt calling for a med refill for Zanaflex 4 mg  pt said med isnt strong enough and would like something stronger if possible and he has a future appointment         Next Visit Date:  Future Appointments   Date Time Provider Cassius Arevalo   5/11/2022  3:15 PM Abdifatah Martinez MD [address] [facility]   Topic Date Due    Hepatitis C screen  Never done    COVID-19 Vaccine (1) Never done    HIV screen  Never done    Lipid screen  Never done    Depression Screen  04/22/2022    Pneumococcal 0-64 years Vaccine (1 of 2 - PPSV23) 04/12/2022 (Originally 5/7/1969)    DTaP/Tdap/Td vaccine (1 - Tdap) 01/12/2023 (Originally 5/7/1982)    Flu vaccine (Season Ended) 01/12/2023 (Originally 9/1/2022)    Shingles Vaccine (1 of 2) 01/12/2023 (Originally 5/7/2013)    Colorectal Cancer Screen  02/25/2032    Hepatitis A vaccine  Aged Out    Hepatitis B vaccine  Aged Out    Hib vaccine  Aged Out    Meningococcal (ACWY) vaccine  Aged Out       No results found for: LABA1C          ( goal A1C is < 7)   No results found for: LABMICR  No results found for: LDLCHOLESTEROL, LDLCALC    (goal LDL is <100)   BUN (mg/dL)   Date Value   12/03/2019 15     BP Readings from Last 3 Encounters:   02/25/22 120/65   02/25/22 121/80   01/12/22 (!) 162/88          (goal 120/80)    All Future Testing planned in [facility]  Lab Frequency Next Occurrence   Hepatitis C Antibody Once 04/22/2022   HIV-1 And HIV-2 Antibodies Once 04/22/2022   Comprehensive Metabolic Panel Once 95/70/7577   Lipid Panel Once 04/72/6296   Basic Metabolic Panel Once 19/37/3272   COVID-19 Once 02/17/2022               Patient Active Problem List:     Neuropathy     Hypertension     Dyslipidemia     Tobacco use disorder     Obesity     Alcohol use     Sciatica associated with disorder of lumbar spine

## 2022-05-11 ENCOUNTER — TELEPHONE (OUTPATIENT)
Dept: INTERNAL MEDICINE | Age: 57
End: 2022-05-11

## 2022-06-09 ENCOUNTER — TELEPHONE (OUTPATIENT)
Dept: INTERNAL MEDICINE | Age: 57
End: 2022-06-09

## 2022-06-10 DIAGNOSIS — I10 ESSENTIAL HYPERTENSION: ICD-10-CM

## 2022-06-10 DIAGNOSIS — M53.86 SCIATICA ASSOCIATED WITH DISORDER OF LUMBAR SPINE: Primary | ICD-10-CM

## 2022-06-10 RX ORDER — IBUPROFEN 600 MG/1
600 TABLET ORAL EVERY 8 HOURS PRN
Qty: 30 TABLET | Refills: 0 | Status: SHIPPED | OUTPATIENT
Start: 2022-06-10 | End: 2022-07-27 | Stop reason: SDUPTHER

## 2022-06-10 RX ORDER — TIZANIDINE 4 MG/1
4 TABLET ORAL EVERY 8 HOURS PRN
Qty: 30 TABLET | Refills: 1 | Status: SHIPPED | OUTPATIENT
Start: 2022-06-10 | End: 2022-07-27 | Stop reason: SDUPTHER

## 2022-06-10 RX ORDER — LISINOPRIL AND HYDROCHLOROTHIAZIDE 12.5; 1 MG/1; MG/1
1 TABLET ORAL DAILY
Qty: 30 TABLET | Refills: 3 | Status: SHIPPED | OUTPATIENT
Start: 2022-06-10 | End: 2022-07-27 | Stop reason: SDUPTHER

## 2022-06-10 NOTE — TELEPHONE ENCOUNTER
Refill request for pended medications. If appropriate please send medication(s) to patients pharmacy. Next appt: Patient on Waitlist for appointment in July .     Last appt: 1/12/2022    Health Maintenance   Topic Date Due    COVID-19 Vaccine (1) Never done    Pneumococcal 0-64 years Vaccine (1 - PCV) Never done    HIV screen  Never done    Hepatitis C screen  Never done    Lipids  Never done    Prostate Specific Antigen (PSA) Screening or Monitoring  Never done    Depression Screen  04/22/2022    DTaP/Tdap/Td vaccine (1 - Tdap) 01/12/2023 (Originally 5/7/1982)    Flu vaccine (Season Ended) 01/12/2023 (Originally 9/1/2022)    Shingles vaccine (1 of 2) 01/12/2023 (Originally 5/7/2013)    Colorectal Cancer Screen  02/25/2032    Hepatitis A vaccine  Aged Out    Hepatitis B vaccine  Aged Out    Hib vaccine  Aged Out    Meningococcal (ACWY) vaccine  Aged Out       No results found for: LABA1C          ( goal A1C is < 7)   No results found for: LABMICR  No results found for: LDLCHOLESTEROL, LDLCALC    (goal LDL is <100)   BUN (mg/dL)   Date Value   12/03/2019 15     BP Readings from Last 3 Encounters:   02/25/22 120/65   02/25/22 121/80   01/12/22 (!) 162/88          (goal 120/80)          Patient Active Problem List:     Neuropathy     Hypertension     Dyslipidemia     Tobacco use disorder     Obesity     Alcohol use     Sciatica associated with disorder of lumbar spine

## 2022-06-10 NOTE — TELEPHONE ENCOUNTER
----- Message from Nicholas Ibarra sent at 6/10/2022  1:08 PM EDT -----  Subject: Message to Provider    QUESTIONS  Information for Provider? Patient called stating he needs medication   refill on blood pressure medication and muscle relaxer for pain, patient   unsure of name and doesn't have medication in front of him would like   refills sent to [facility]\"" [address] [phone]  ---------------------------------------------------------------------------  --------------  [address]  What is the best way for the office to contact you? OK to leave message on   voicemail  Preferred Call Back Phone Number? [phone]  ---------------------------------------------------------------------------  --------------  SCRIPT ANSWERS  Relationship to Patient?  Self

## 2022-07-27 ENCOUNTER — OFFICE VISIT (OUTPATIENT)
Dept: INTERNAL MEDICINE | Age: 57
End: 2022-07-27
Payer: COMMERCIAL

## 2022-07-27 VITALS
WEIGHT: 194 LBS | HEIGHT: 70 IN | OXYGEN SATURATION: 97 % | DIASTOLIC BLOOD PRESSURE: 95 MMHG | SYSTOLIC BLOOD PRESSURE: 165 MMHG | HEART RATE: 76 BPM | BODY MASS INDEX: 27.77 KG/M2 | TEMPERATURE: 98.2 F

## 2022-07-27 DIAGNOSIS — E78.5 DYSLIPIDEMIA: ICD-10-CM

## 2022-07-27 DIAGNOSIS — F17.200 TOBACCO USE DISORDER: ICD-10-CM

## 2022-07-27 DIAGNOSIS — Z12.5 PROSTATE CANCER SCREENING: ICD-10-CM

## 2022-07-27 DIAGNOSIS — M48.062 SPINAL STENOSIS OF LUMBAR REGION WITH NEUROGENIC CLAUDICATION: Primary | ICD-10-CM

## 2022-07-27 DIAGNOSIS — I10 PRIMARY HYPERTENSION: ICD-10-CM

## 2022-07-27 PROCEDURE — G8427 DOCREV CUR MEDS BY ELIG CLIN: HCPCS

## 2022-07-27 PROCEDURE — 99213 OFFICE O/P EST LOW 20 MIN: CPT

## 2022-07-27 PROCEDURE — 3017F COLORECTAL CA SCREEN DOC REV: CPT

## 2022-07-27 PROCEDURE — G8419 CALC BMI OUT NRM PARAM NOF/U: HCPCS

## 2022-07-27 PROCEDURE — 4004F PT TOBACCO SCREEN RCVD TLK: CPT

## 2022-07-27 RX ORDER — TIZANIDINE 4 MG/1
4 TABLET ORAL EVERY 8 HOURS PRN
Qty: 30 TABLET | Refills: 1 | Status: SHIPPED | OUTPATIENT
Start: 2022-07-27 | End: 2022-09-09

## 2022-07-27 RX ORDER — LISINOPRIL AND HYDROCHLOROTHIAZIDE 12.5; 1 MG/1; MG/1
1 TABLET ORAL DAILY
Qty: 30 TABLET | Refills: 1 | Status: SHIPPED | OUTPATIENT
Start: 2022-07-27 | End: 2022-09-23

## 2022-07-27 RX ORDER — IBUPROFEN 600 MG/1
600 TABLET ORAL EVERY 8 HOURS PRN
Qty: 30 TABLET | Refills: 0 | Status: SHIPPED | OUTPATIENT
Start: 2022-07-27 | End: 2022-10-26 | Stop reason: SDUPTHER

## 2022-07-27 SDOH — ECONOMIC STABILITY: FOOD INSECURITY: WITHIN THE PAST 12 MONTHS, YOU WORRIED THAT YOUR FOOD WOULD RUN OUT BEFORE YOU GOT MONEY TO BUY MORE.: NEVER TRUE

## 2022-07-27 SDOH — ECONOMIC STABILITY: FOOD INSECURITY: WITHIN THE PAST 12 MONTHS, THE FOOD YOU BOUGHT JUST DIDN'T LAST AND YOU DIDN'T HAVE MONEY TO GET MORE.: NEVER TRUE

## 2022-07-27 ASSESSMENT — ENCOUNTER SYMPTOMS
VOMITING: 0
BLOOD IN STOOL: 0
SORE THROAT: 0
RHINORRHEA: 0
SHORTNESS OF BREATH: 0
COUGH: 0
CHOKING: 0
CONSTIPATION: 0
ABDOMINAL DISTENTION: 0
BACK PAIN: 1
DIARRHEA: 0
WHEEZING: 0
SINUS PAIN: 0
COLOR CHANGE: 0
APNEA: 0

## 2022-07-27 ASSESSMENT — PATIENT HEALTH QUESTIONNAIRE - PHQ9
SUM OF ALL RESPONSES TO PHQ QUESTIONS 1-9: 0
1. LITTLE INTEREST OR PLEASURE IN DOING THINGS: 0
2. FEELING DOWN, DEPRESSED OR HOPELESS: 0
SUM OF ALL RESPONSES TO PHQ QUESTIONS 1-9: 0
SUM OF ALL RESPONSES TO PHQ QUESTIONS 1-9: 0
SUM OF ALL RESPONSES TO PHQ9 QUESTIONS 1 & 2: 0
SUM OF ALL RESPONSES TO PHQ QUESTIONS 1-9: 0

## 2022-07-27 ASSESSMENT — SOCIAL DETERMINANTS OF HEALTH (SDOH): HOW HARD IS IT FOR YOU TO PAY FOR THE VERY BASICS LIKE FOOD, HOUSING, MEDICAL CARE, AND HEATING?: NOT HARD AT ALL

## 2022-07-27 NOTE — PROGRESS NOTES
Attending Physician Statement  I have discussed the care of Tonia Granados, including pertinent history and exam findings,  with the resident. I have reviewed the key elements of all parts of the encounter with the resident. I agree with the assessment, plan and orders as documented by the resident.   (GE Modifier)

## 2022-07-27 NOTE — PROGRESS NOTES
[facility], [place] [address] [address]  Dept: [phone]  Dept Fax: [phone]    Office Progress/Follow Up Note  Date ofpatient's visit: 7/27/2022  Patient's Name:  Myesha Root YOB: 1965            Patient Care Team:  Manuela Simpson MD as PCP - General (Internal Medicine)  ================================================================    REASON FOR VISIT/CHIEF COMPLAINT:  Hypertension, Health Maintenance (Declines vaccines, labs pended due per HM ), and Back Pain (Discuss FMLA forms for sciatica nerve pain)    HISTORY OF PRESENTING ILLNESS:  History was obtained from: patient, electronic medical record. Reggie Calderon a 59 y.o. is here for a follow-up visit. Patient is complaining of back pain. The pain is heavy low back pain, no radiation down to lower extremities, no numbness, tingling, weakness of lower extremities. No change in the quality or intensity of the pain recently. He has history of sciatica and lumbar spinal stenosis. Doing physical therapy exercises at home with little relief. Taking Zanaflex and ibuprofen. Wants his FMLA form to be signed. He has history of hypertension, on lisinopril-HCTZ 10-12.5 daily at home. 165/95 today. Does not check his blood pressure regularly. Blood pressure is high during this visit. He is complaining of back pain today. High blood pressure might be related to it. We will follow in 6 weeks. Denies any chest pain, shortness of breath, weight loss, fevers headache,  cough, contact with sick people around him, abdominal pain, dysuria. Recent colonoscopy 2/25 shows 6 mm pedunculated polyp. Pathology shows tubular adenoma. Follow-up colonoscopy 5-7 years advised     Patient has smoking history, 1/2 PPD for the last 40 years. Denies low-dose chest CT for CT lung screening. Smoking cessation counseling done. Denies vaccination for COVID. Denies Hep C/HIV screen, Vaping Use: Never used   Substance Use Topics    Alcohol use: Not Currently     Comment: patient stopped drinking 1 year ago 4/14/21    Drug use: No       Family History   Problem Relation Age of Onset    Other Mother         seizures    High Blood Pressure Mother     High Blood Pressure Father     High Blood Pressure Sister     High Blood Pressure Brother         REVIEW OF SYSTEMS:  Review of Systems   Constitutional:  Negative for activity change, appetite change, diaphoresis and fever. HENT:  Negative for congestion, hearing loss, postnasal drip, rhinorrhea, sinus pain and sore throat. Respiratory:  Negative for apnea, cough, choking, shortness of breath and wheezing. Cardiovascular:  Negative for chest pain, palpitations and leg swelling. Gastrointestinal:  Negative for abdominal distention, blood in stool, constipation, diarrhea and vomiting. Genitourinary:  Negative for difficulty urinating, dysuria, flank pain, frequency, hematuria, scrotal swelling and urgency. Musculoskeletal:  Positive for back pain. Skin:  Negative for color change, pallor, rash and wound. Neurological:  Negative for dizziness, weakness and numbness. Psychiatric/Behavioral:  Negative for agitation, confusion and hallucinations. The patient is not nervous/anxious and is not hyperactive. PHYSICAL EXAM:  Vitals:    07/27/22 1358 07/27/22 1405   BP: (!) 168/98 (!) 165/95   Site: Left Upper Arm Right Upper Arm   Position: Sitting Sitting   Cuff Size: Medium Adult Medium Adult   Pulse: 76 76   Temp: 98.2 °F (36.8 °C)    SpO2: 97%    Weight: 194 lb (88 kg)    Height: 5' 10\" (1.778 m)      BP Readings from Last 3 Encounters:   07/27/22 (!) 165/95   02/25/22 120/65   02/25/22 121/80        Physical Exam  Constitutional:       General: He is not in acute distress. Appearance: He is not toxic-appearing or diaphoretic. Cardiovascular:      Heart sounds: No murmur heard. No friction rub. No gallop.    Pulmonary: Effort: No respiratory distress. Breath sounds: No stridor. No rhonchi or rales. Chest:      Chest wall: No tenderness. Abdominal:      General: There is no distension. Palpations: There is no mass. Tenderness: no abdominal tenderness There is no guarding. Hernia: No hernia is present. Musculoskeletal:         General: No swelling, deformity or signs of injury. Right lower leg: No edema. Left lower leg: No edema. Skin:     Coloration: Skin is not jaundiced or pale. Findings: No bruising, erythema, lesion or rash. Neurological:      Cranial Nerves: No cranial nerve deficit. Sensory: No sensory deficit. Motor: No weakness. Coordination: Coordination normal.         DIAGNOSTIC FINDINGS:  CBC:  Lab Results   Component Value Date/Time    WBC 8.9 12/03/2019 07:51 PM    HGB 12.9 12/03/2019 07:51 PM    PLT See Reflexed IPF Result 12/03/2019 07:51 PM       BMP:    Lab Results   Component Value Date/Time     12/03/2019 07:51 PM    K 3.4 12/03/2019 07:51 PM     12/03/2019 07:51 PM    CO2 17 12/03/2019 07:51 PM    BUN 15 12/03/2019 07:51 PM    CREATININE 0.88 12/03/2019 07:51 PM    GLUCOSE 93 12/03/2019 07:51 PM       HEMOGLOBIN A1C: No results found for: LABA1C    FASTING LIPID PANEL:No results found for: CHOL, HDL, TRIG    ASSESSMENT AND PLAN:  Charo eGrmain was seen today for hypertension, health maintenance and back pain. Diagnoses and all orders for this visit:    Spinal stenosis of lumbar region with neurogenic claudication         -no recent change in the severity or intensity or character of pain  -     tiZANidine (ZANAFLEX) 4 MG tablet; Take 1 tablet by mouth every 8 hours as needed (sciatica)  -     ibuprofen (ADVIL;MOTRIN) 600 MG tablet; Take 1 tablet by mouth every 8 hours as needed for Pain  -     [facility]  -     [facility], 2 days/month    Primary hypertension         -BP high during this visit.   Currently complaining of back pain.  Follow-up within 6 weeks  -     CBC with Auto Differential; Future  -     Basic Metabolic Panel; Future  -     lisinopril-hydroCHLOROthiazide (PRINZIDE;ZESTORETIC) 10-12.5 MG per tablet; Take 1 tablet by mouth in the morning. Dyslipidemia  -Lipid screen    Tobacco use disorder  -Recommended cessation  -Denying low-dose CT chest    Prostate cancer screening  -     PSA Screening; Future       FOLLOW UP AND INSTRUCTIONS:  Return in about 6 weeks (around 9/7/2022). Janina Vogel received counseling on the following healthy behaviors: nutrition, medication adherence, and tobacco cessation    Discussed use, benefit, and side effects of prescribed medications. Barriers to medication compliance addressed. All patient questions answered. Pt voiced understanding. Patient given educational materials - see patient instructions    Bhupinder Wilkes MD  Resident Internal Medicine  [facility]. 7/27/2022, 2:59 PM    This note is created with the assistance of a speech-recognition program. While intending to generate a document that actually reflects the content of thevisit, the document can still have some mistakes which may not have been identified and corrected by editing.

## 2022-09-09 DIAGNOSIS — M48.062 SPINAL STENOSIS OF LUMBAR REGION WITH NEUROGENIC CLAUDICATION: ICD-10-CM

## 2022-09-09 RX ORDER — TIZANIDINE 4 MG/1
TABLET ORAL
Qty: 30 TABLET | Refills: 1 | Status: SHIPPED | OUTPATIENT
Start: 2022-09-09

## 2022-09-09 NOTE — TELEPHONE ENCOUNTER
Refill request for tiZANidine (ZANAFLEX) 4 MG tablet. If appropriate please send medication(s) to patients pharmacy.     Next appt: 9/28/2022      Health Maintenance   Topic Date Due    COVID-19 Vaccine (1) Never done    Pneumococcal 0-64 years Vaccine (1 - PCV) Never done    HIV screen  Never done    Hepatitis C screen  Never done    Lipids  Never done    Flu vaccine (1) Never done    DTaP/Tdap/Td vaccine (1 - Tdap) 01/12/2023 (Originally 5/7/1982)    Shingles vaccine (1 of 2) 01/12/2023 (Originally 5/7/2013)    Depression Screen  07/27/2023    Colorectal Cancer Screen  02/25/2032    Hepatitis A vaccine  Aged Out    Hepatitis B vaccine  Aged Out    Hib vaccine  Aged Out    Meningococcal (ACWY) vaccine  Aged Out       No results found for: LABA1C          ( goal A1C is < 7)   No results found for: LABMICR  No results found for: LDLCHOLESTEROL, LDLCALC    (goal LDL is <100)   BUN (mg/dL)   Date Value   12/03/2019 15     BP Readings from Last 3 Encounters:   07/27/22 (!) 165/95   02/25/22 120/65   02/25/22 121/80          (goal 120/80)          Patient Active Problem List:     Neuropathy     Hypertension     Dyslipidemia     Tobacco use disorder     Obesity     Alcohol use     Sciatica associated with disorder of lumbar spine

## 2022-09-20 ENCOUNTER — HOSPITAL ENCOUNTER (OUTPATIENT)
Age: 57
Setting detail: SPECIMEN
Discharge: HOME | End: 2022-09-20

## 2022-09-20 DIAGNOSIS — I10 PRIMARY HYPERTENSION: ICD-10-CM

## 2022-09-20 DIAGNOSIS — I10 ESSENTIAL HYPERTENSION: ICD-10-CM

## 2022-09-20 DIAGNOSIS — Z12.5 PROSTATE CANCER SCREENING: ICD-10-CM

## 2022-09-20 LAB
ABSOLUTE EOS #: 0.39 K/UL (ref 0–0.44)
ABSOLUTE IMMATURE GRANULOCYTE: <0.03 K/UL (ref 0–0.3)
ABSOLUTE LYMPH #: 2.35 K/UL (ref 1.1–3.7)
ABSOLUTE MONO #: 0.93 K/UL (ref 0.1–1.2)
ANION GAP SERPL CALCULATED.3IONS-SCNC: 13 MMOL/L (ref 9–17)
BASOPHILS # BLD: 0 % (ref 0–2)
BASOPHILS ABSOLUTE: 0.04 K/UL (ref 0–0.2)
BUN BLDV-MCNC: 15 MG/DL (ref 6–20)
CALCIUM SERPL-MCNC: 8.9 MG/DL (ref 8.6–10.4)
CHLORIDE BLD-SCNC: 107 MMOL/L (ref 98–107)
CO2: 23 MMOL/L (ref 20–31)
CREAT SERPL-MCNC: 1.05 MG/DL (ref 0.7–1.2)
EOSINOPHILS RELATIVE PERCENT: 4 % (ref 1–4)
GFR AFRICAN AMERICAN: >60 ML/MIN
GFR NON-AFRICAN AMERICAN: >60 ML/MIN
GFR SERPL CREATININE-BSD FRML MDRD: ABNORMAL ML/MIN/{1.73_M2}
GLUCOSE BLD-MCNC: 105 MG/DL (ref 70–99)
HCT VFR BLD CALC: 42.5 % (ref 40.7–50.3)
HEMOGLOBIN: 13.9 G/DL (ref 13–17)
IMMATURE GRANULOCYTES: 0 %
LYMPHOCYTES # BLD: 26 % (ref 24–43)
MCH RBC QN AUTO: 28 PG (ref 25.2–33.5)
MCHC RBC AUTO-ENTMCNC: 32.7 G/DL (ref 28.4–34.8)
MCV RBC AUTO: 85.7 FL (ref 82.6–102.9)
MONOCYTES # BLD: 10 % (ref 3–12)
NRBC AUTOMATED: 0 PER 100 WBC
PDW BLD-RTO: 16.1 % (ref 11.8–14.4)
PLATELET # BLD: ABNORMAL K/UL (ref 138–453)
PLATELET, FLUORESCENCE: 214 K/UL (ref 138–453)
PLATELET, IMMATURE FRACTION: 11.2 % (ref 1.1–10.3)
POTASSIUM SERPL-SCNC: 3.9 MMOL/L (ref 3.7–5.3)
PROSTATE SPECIFIC ANTIGEN: 0.33 NG/ML
RBC # BLD: 4.96 M/UL (ref 4.21–5.77)
RBC # BLD: ABNORMAL 10*6/UL
SEG NEUTROPHILS: 59 % (ref 36–65)
SEGMENTED NEUTROPHILS ABSOLUTE COUNT: 5.28 K/UL (ref 1.5–8.1)
SODIUM BLD-SCNC: 143 MMOL/L (ref 135–144)
WBC # BLD: 9 K/UL (ref 3.5–11.3)

## 2022-09-23 ENCOUNTER — OFFICE VISIT (OUTPATIENT)
Dept: INTERNAL MEDICINE | Age: 57
End: 2022-09-23
Payer: COMMERCIAL

## 2022-09-23 VITALS
SYSTOLIC BLOOD PRESSURE: 160 MMHG | OXYGEN SATURATION: 94 % | DIASTOLIC BLOOD PRESSURE: 90 MMHG | HEIGHT: 69 IN | WEIGHT: 195 LBS | BODY MASS INDEX: 28.88 KG/M2 | HEART RATE: 72 BPM | TEMPERATURE: 97.9 F

## 2022-09-23 DIAGNOSIS — I10 UNCONTROLLED HYPERTENSION: Primary | ICD-10-CM

## 2022-09-23 DIAGNOSIS — F17.200 TOBACCO USE DISORDER: ICD-10-CM

## 2022-09-23 DIAGNOSIS — G44.019 EPISODIC CLUSTER HEADACHE, NOT INTRACTABLE: ICD-10-CM

## 2022-09-23 PROCEDURE — 99214 OFFICE O/P EST MOD 30 MIN: CPT | Performed by: STUDENT IN AN ORGANIZED HEALTH CARE EDUCATION/TRAINING PROGRAM

## 2022-09-23 PROCEDURE — 99211 OFF/OP EST MAY X REQ PHY/QHP: CPT | Performed by: STUDENT IN AN ORGANIZED HEALTH CARE EDUCATION/TRAINING PROGRAM

## 2022-09-23 RX ORDER — BUTALBITAL, ACETAMINOPHEN AND CAFFEINE 50; 325; 40 MG/1; MG/1; MG/1
1 TABLET ORAL EVERY 6 HOURS PRN
Qty: 56 TABLET | Refills: 0 | Status: SHIPPED | OUTPATIENT
Start: 2022-09-23 | End: 2022-10-12

## 2022-09-23 RX ORDER — LISINOPRIL AND HYDROCHLOROTHIAZIDE 25; 20 MG/1; MG/1
1 TABLET ORAL DAILY
Qty: 30 TABLET | Refills: 1 | Status: SHIPPED | OUTPATIENT
Start: 2022-09-23

## 2022-09-23 ASSESSMENT — ENCOUNTER SYMPTOMS
CHEST TIGHTNESS: 0
NAUSEA: 0
SHORTNESS OF BREATH: 0
BACK PAIN: 0
VOMITING: 0
COUGH: 0
EYE DISCHARGE: 0
COLOR CHANGE: 0

## 2022-09-23 ASSESSMENT — PATIENT HEALTH QUESTIONNAIRE - PHQ9
SUM OF ALL RESPONSES TO PHQ QUESTIONS 1-9: 0
SUM OF ALL RESPONSES TO PHQ QUESTIONS 1-9: 0
1. LITTLE INTEREST OR PLEASURE IN DOING THINGS: 0
2. FEELING DOWN, DEPRESSED OR HOPELESS: 0
SUM OF ALL RESPONSES TO PHQ QUESTIONS 1-9: 0
SUM OF ALL RESPONSES TO PHQ QUESTIONS 1-9: 0
SUM OF ALL RESPONSES TO PHQ9 QUESTIONS 1 & 2: 0

## 2022-09-23 NOTE — PROGRESS NOTES
[facility] [address] [address]  Dept: [phone]  Dept Fax: [phone]    Office Progress/Follow Up Note  Date ofpatient's visit: 9/23/2022  Patient's Name:  Roman Do YOB: 1965            Patient Care Team:  Ayala Paris MD as PCP - General (Internal Medicine)  ================================================================    REASON FOR VISIT/CHIEF COMPLAINT:  Hypertension, Migraine (Cluster headaches dx at [facility] ), and Health Maintenance (Depression screen negative )    HISTORY OF PRESENTING ILLNESS:  History was obtained from: patient. Bri Orona a 59 y.o. is here for same-day visit for high blood pressure. 61-year-old male with history of hypertension on lisinopril-hydrochlorothiazide 10 mg / 12.5 mg. He is active smoker smokes half a pack per day. Patient was recently seen at [facility] for cluster headache. Where he was noted to have blood pressure in 200's. Lab work was done including CMP sodium 138, potassium 3.6, chloride 102, CO2 26, BUN 15, creatinine 0.96, AST ALT normal.  H&H 13.4 and 41.6 with WBC 10.7. Imaging was obtained CT negative for acute hemorrhage or midline shift. Complaining of headache which usually get at the end of the shift rating 9 x 10 with right eye and tearing going on for last 9 days. Takes Motrin not helping with the pain. Clinic his blood pressure 163/103 repeat 160/90 with heart rate 72. Denies any numbness or tingling, chest pain, hematuria, weakness of upper and lower extremity.   Patient reported he has compliance with his medication he      Patient Active Problem List   Diagnosis    Neuropathy    Hypertension    Dyslipidemia    Tobacco use disorder    Obesity    Alcohol use    Sciatica associated with disorder of lumbar spine       Health Maintenance Due   Topic Date Due    COVID-19 Vaccine (1) Never done    Pneumococcal 0-64 years Vaccine (1 - PCV) Never done    HIV screen  Never done    Hepatitis C screen  Never done    Diabetes screen  Never done    Lipids  Never done    Flu vaccine (1) Never done       No Known Allergies      Current Outpatient Medications   Medication Sig Dispense Refill    butalbital-acetaminophen-caffeine (FIORICET, ESGIC) -40 MG per tablet Take 1 tablet by mouth every 6 hours as needed for Headaches 56 tablet 0    lisinopril-hydroCHLOROthiazide (PRINZIDE;ZESTORETIC) 20-25 MG per tablet Take 1 tablet by mouth daily 30 tablet 1    tiZANidine (ZANAFLEX) 4 MG tablet take 1 tablet by mouth every 8 hours if needed for SCIATICA 30 tablet 1    ibuprofen (ADVIL;MOTRIN) 600 MG tablet Take 1 tablet by mouth every 8 hours as needed for Pain 30 tablet 0     No current facility-administered medications for this visit. Social History     Tobacco Use    Smoking status: Every Day     Packs/day: 0.50     Years: 35.00     Pack years: 17.50     Types: Cigarettes    Smokeless tobacco: Never    Tobacco comments:     pt states he is ready to quit. Vaping Use    Vaping Use: Never used   Substance Use Topics    Alcohol use: Not Currently     Comment: patient stopped drinking 1 year ago 4/14/21    Drug use: No       Family History   Problem Relation Age of Onset    Other Mother         seizures    High Blood Pressure Mother     High Blood Pressure Father     High Blood Pressure Sister     High Blood Pressure Brother         REVIEW OF SYSTEMS:  Review of Systems   Constitutional:  Negative for activity change, appetite change and fatigue. Eyes:  Negative for discharge. Respiratory:  Negative for cough, chest tightness and shortness of breath. Cardiovascular:  Negative for chest pain, palpitations and leg swelling. Gastrointestinal:  Negative for nausea and vomiting. Genitourinary:  Negative for difficulty urinating. Musculoskeletal:  Negative for back pain and neck pain. Skin:  Negative for color change and rash.    Neurological: Negative for weakness and light-headedness. Psychiatric/Behavioral:  Negative for behavioral problems and decreased concentration. PHYSICAL EXAM:  Vitals:    09/23/22 1318 09/23/22 1340   BP: (!) 163/103 (!) 160/90   Site: Right Upper Arm Right Upper Arm   Position: Sitting Sitting   Cuff Size: Medium Adult Medium Adult   Pulse: 72    Temp: 97.9 °F (36.6 °C)    SpO2: 94%    Weight: 195 lb (88.5 kg)    Height: 5' 9\" (1.753 m)      BP Readings from Last 3 Encounters:   09/23/22 (!) 160/90   07/27/22 (!) 165/95   02/25/22 120/65        Physical Exam  Constitutional:       Appearance: Normal appearance. HENT:      Head: Normocephalic. Right Ear: Tympanic membrane normal.      Nose: Nose normal.      Mouth/Throat:      Mouth: Mucous membranes are moist.   Eyes:      Pupils: Pupils are equal, round, and reactive to light. Cardiovascular:      Rate and Rhythm: Normal rate. Pulmonary:      Effort: Pulmonary effort is normal.   Abdominal:      General: Abdomen is flat. Palpations: Abdomen is soft. Musculoskeletal:         General: Normal range of motion. Neurological:      General: No focal deficit present. Mental Status: He is alert. Psychiatric:         Behavior: Behavior normal.         DIAGNOSTIC FINDINGS:  CBC:  Lab Results   Component Value Date/Time    WBC 9.0 09/20/2022 03:45 PM    HGB 13.9 09/20/2022 03:45 PM    PLT See Reflexed IPF Result 09/20/2022 03:45 PM       BMP:    Lab Results   Component Value Date/Time     09/20/2022 03:45 PM    K 3.9 09/20/2022 03:45 PM     09/20/2022 03:45 PM    CO2 23 09/20/2022 03:45 PM    BUN 15 09/20/2022 03:45 PM    CREATININE 1.05 09/20/2022 03:45 PM    GLUCOSE 105 09/20/2022 03:45 PM       HEMOGLOBIN A1C: No results found for: LABA1C    FASTING LIPID PANEL:No results found for: CHOL, HDL, TRIG    ASSESSMENT AND PLAN:  Odilia Johnston was seen today for hypertension, migraine and health maintenance.     Diagnoses and all orders for this visit:    Uncontrolled hypertension  -     lisinopril-hydroCHLOROthiazide (PRINZIDE;ZESTORETIC) 20-25 MG per tablet; Take 1 tablet by mouth daily  -Increase the dose of lisinopril to 20 and hydrochlorothiazide to 25. Check blood pressure regularly at home. Make log and bring in next visit. Episodic cluster headache, not intractable  -     butalbital-acetaminophen-caffeine (FIORICET, ESGIC) -40 MG per tablet; Take 1 tablet by mouth every 6 hours as needed for Headaches    FOLLOW UP AND INSTRUCTIONS:  Patient to follow-up with your  PCP in 2 weeks. Ailyn Joiner received counseling on the following healthy behaviors: nutrition, exercise, and medication adherence    Discussed use, benefit, and side effects of prescribed medications. Barriers to medication compliance addressed. All patient questions answered. Pt voiced understanding. Patient given educational materials - see patient instructions    Jhony Da Silva MD  Internal Medicine Resident  [facility]   9/23/2022, 1:57 PM          This note is created with the assistance of a speech-recognition program. While intending to generate a document that actually reflects the content of thevisit, the document can still have some mistakes which may not have been identified and corrected by editing.

## 2022-09-24 NOTE — PROGRESS NOTES
Attending Physician Statement  I have discussed the care of Lotus Muir including pertinent history and exam findings,  with the resident. I have reviewed the key elements of all parts of the encounter with the resident. I agree with the assessment, plan and orders as documented by the resident.     Edis Fitzgerald MD  9/24/2022

## 2022-09-28 ENCOUNTER — OFFICE VISIT (OUTPATIENT)
Dept: INTERNAL MEDICINE | Age: 57
End: 2022-09-28
Payer: COMMERCIAL

## 2022-09-28 VITALS
TEMPERATURE: 97.7 F | OXYGEN SATURATION: 96 % | HEART RATE: 64 BPM | HEIGHT: 69 IN | SYSTOLIC BLOOD PRESSURE: 151 MMHG | WEIGHT: 200 LBS | DIASTOLIC BLOOD PRESSURE: 86 MMHG | BODY MASS INDEX: 29.62 KG/M2

## 2022-09-28 DIAGNOSIS — G44.019 EPISODIC CLUSTER HEADACHE, NOT INTRACTABLE: ICD-10-CM

## 2022-09-28 DIAGNOSIS — M53.86 SCIATICA ASSOCIATED WITH DISORDER OF LUMBAR SPINE: ICD-10-CM

## 2022-09-28 DIAGNOSIS — I10 PRIMARY HYPERTENSION: Primary | ICD-10-CM

## 2022-09-28 PROCEDURE — 99211 OFF/OP EST MAY X REQ PHY/QHP: CPT | Performed by: STUDENT IN AN ORGANIZED HEALTH CARE EDUCATION/TRAINING PROGRAM

## 2022-09-28 PROCEDURE — 99214 OFFICE O/P EST MOD 30 MIN: CPT

## 2022-09-28 RX ORDER — BLOOD PRESSURE TEST KIT
1 KIT MISCELLANEOUS ONCE
Qty: 1 KIT | Refills: 0 | Status: SHIPPED | OUTPATIENT
Start: 2022-09-28 | End: 2022-10-12

## 2022-09-28 RX ORDER — AMLODIPINE BESYLATE 5 MG/1
5 TABLET ORAL DAILY
Qty: 30 TABLET | Refills: 5 | Status: SHIPPED | OUTPATIENT
Start: 2022-09-28

## 2022-09-28 NOTE — PROGRESS NOTES
[facility] [address] [address]  Dept: [phone]  Dept Fax: [phone]    Office Progress/Follow Up Note  Date ofpatient's visit: 9/28/2022  Patient's Name:  Nancy Francisco YOB: 1965            Patient Care Team:  Thuan Freitas MD as PCP - General (Internal Medicine)  ================================================================    REASON FOR VISIT/CHIEF COMPLAINT:  Hypertension (Pt took medication) and Headache (Cluster headache everyday, only happen when he goes to sleep)    HISTORY OF PRESENTING ILLNESS:  History was obtained from: patient, electronic medical record. Fazal landry 59 y.o. is here for a follow up of HTN. Patient was seen here for hypertension 9/23, was on lisinopril-hydrochlorothiazide 10 mg / 12.5 mg.  Clinic  blood pressure was 163/103 repeat 160/90 with heart rate 72. Reported compliance. Lisinopril HCTZ was increased to 20-25 mg. Bp today 160/89, 151/86. He does not check his blood pressure at home. Patient was recently seen at [facility] for cluster headache. Where he was noted to have blood pressure in 200's. Lab work was done including CMP sodium 138, potassium 3.6, chloride 102, CO2 26, BUN 15, creatinine 0.96, AST ALT normal.  H&H 13.4 and 41.6 with WBC 10.7. Imaging was obtained CT negative for acute hemorrhage or midline shift. Patient reported headache started 11 days ago, 10/10 in intensity, unilateral on the left side, associated with rhinorrhea and watering of the left eye. Denies nausea and vomiting. Denies any head trauma, focal neurologic deficit or weakness associated with it. He is getting those episodes every day, awakens him from sleep. Episodes last from 20 minutes to 3 hours. Patient is complaining of back pain. The pain is heavy low back pain, no radiation down to lower extremities, no numbness, tingling, weakness of lower extremities.   No change in the quality or intensity of the pain recently. He has history of sciatica and lumbar spinal stenosis. Doing physical therapy exercises at home with little relief. Taking Zanaflex and ibuprofen. Denies any chest pain, shortness of breath, weight loss, fevers   cough, contact with sick people around him, abdominal pain, dysuria. Recent colonoscopy 2/25 shows 6 mm pedunculated polyp. Pathology shows tubular adenoma. Follow-up colonoscopy 5-7 years advised     Patient has smoking history, 1/2 PPD for the last 40 years. Denies low-dose chest CT for CT lung screening. Smoking cessation counseling done. Denies vaccination for COVID. Denies Hep C/HIV screen, HbA1c. .  We have talked to him in detail regarding the importance of screening for diabetes, early diagnosis and prevention of endorgan damage. Also discussed with him importance of lipid screen and its impact on different organs. PHQ 2: neg    Denies alcohol abuse, marijuana abuse or cocaine abuse. Diagnosis Orders   1. Episodic cluster headache, not intractable        2. Primary hypertension        3.  Sciatica associated with disorder of lumbar spine           Patient Active Problem List   Diagnosis    Neuropathy    Hypertension    Tobacco use disorder    Alcohol use    Sciatica associated with disorder of lumbar spine       Health Maintenance Due   Topic Date Due    Diabetes screen  Never done       No Known Allergies      Current Outpatient Medications   Medication Sig Dispense Refill    butalbital-acetaminophen-caffeine (FIORICET, ESGIC) -40 MG per tablet Take 1 tablet by mouth every 6 hours as needed for Headaches 56 tablet 0    lisinopril-hydroCHLOROthiazide (PRINZIDE;ZESTORETIC) 20-25 MG per tablet Take 1 tablet by mouth daily 30 tablet 1    tiZANidine (ZANAFLEX) 4 MG tablet take 1 tablet by mouth every 8 hours if needed for SCIATICA 30 tablet 1    ibuprofen (ADVIL;MOTRIN) 600 MG tablet Take 1 tablet by mouth every 8 hours as needed for Pain 30 tablet 0     No current facility-administered medications for this visit. Social History     Tobacco Use    Smoking status: Every Day     Packs/day: 0.50     Years: 35.00     Pack years: 17.50     Types: Cigarettes    Smokeless tobacco: Never    Tobacco comments:     pt states he is ready to quit. Vaping Use    Vaping Use: Never used   Substance Use Topics    Alcohol use: Not Currently     Comment: patient stopped drinking 1 year ago 4/14/21    Drug use: No       Family History   Problem Relation Age of Onset    Other Mother         seizures    High Blood Pressure Mother     High Blood Pressure Father     High Blood Pressure Sister     High Blood Pressure Brother         REVIEW OF SYSTEMS:  Denies:  Chest pain  Shortness of breath  Abdominal pain or diarrhea  Burning micturition/ urgency  Denies contact with sick people or flu symptoms      PHYSICAL EXAM:  Vitals:    09/28/22 1409 09/28/22 1414   BP: (!) 160/89 (!) 151/86   Site: Right Upper Arm    Position: Sitting    Cuff Size: Medium Adult    Pulse: 64 64   Temp: 97.7 °F (36.5 °C)    SpO2: 96%    Weight: 200 lb (90.7 kg)    Height: 5' 9\" (1.753 m)      BP Readings from Last 3 Encounters:   09/28/22 (!) 151/86   09/23/22 (!) 160/90   07/27/22 (!) 165/95        Physical Exam  Constitutional:       Appearance: He is not ill-appearing or toxic-appearing. Cardiovascular:      Heart sounds: No murmur heard. No friction rub. No gallop. Pulmonary:      Effort: No respiratory distress. Breath sounds: No wheezing or rales. Chest:      Chest wall: No tenderness. Abdominal:      General: There is no distension. Tenderness: There is no right CVA tenderness or left CVA tenderness. Musculoskeletal:         General: No swelling or deformity. Right lower leg: No edema. Left lower leg: No edema.      DIAGNOSTIC FINDINGS:  CBC:  Lab Results   Component Value Date/Time    WBC 9.0 09/20/2022 03:45 PM    HGB 13.9 09/20/2022 03:45 PM    PLT See Reflexed IPF Result 09/20/2022 03:45 PM       BMP:    Lab Results   Component Value Date/Time     09/20/2022 03:45 PM    K 3.9 09/20/2022 03:45 PM     09/20/2022 03:45 PM    CO2 23 09/20/2022 03:45 PM    BUN 15 09/20/2022 03:45 PM    CREATININE 1.05 09/20/2022 03:45 PM    GLUCOSE 105 09/20/2022 03:45 PM       HEMOGLOBIN A1C: No results found for: LABA1C    FASTING LIPID PANEL:No results found for: CHOL, HDL, TRIG    ASSESSMENT AND PLAN:  Joann Gagnon was seen today for hypertension and headache. Diagnoses and all orders for this visit:    Episodic cluster headache, not intractable  - Continue on  fioricet-esgic as needed q6 and advil  -F/u in 4 weeks  -Not a candidate for Triptan at this point due to uncontrolled BP    Primary hypertension  -continue on lisinopril- HCTZ 20-25  - will add amlodipine 5 mg daily  -check your bp at home  - BP kit  -F/u in 2 weeks    Sciatica associated with disorder of lumbar spine  -Currently stable  -Continue on PRN pain meds and zanaflex       FOLLOW UP AND INSTRUCTIONS:  Return in about 2 weeks (around 10/12/2022). Jonan Gagnon received counseling on the following healthy behaviors: nutrition, exercise, medication adherence, and tobacco cessation    Discussed use, benefit, and side effects of prescribed medications. Barriers to medication compliance addressed. All patient questions answered. Pt voiced understanding. Patient given educational materials - see patient instructions    Manuela Simpson MD  Resident Internal Medicine  [facility]. 9/28/2022, 3:03 PM    This note is created with the assistance of a speech-recognition program. While intending to generate a document that actually reflects the content of thevisit, the document can still have some mistakes which may not have been identified and corrected by editing.

## 2022-09-29 NOTE — PROGRESS NOTES
Attending Physician Statement  I have discussed the care of Gwen Streeter including pertinent history and exam findings,  with the resident. I have reviewed the key elements of all parts of the encounter with the resident. I agree with the assessment, plan and orders as documented by the resident.     Sagar Proctor MD  9/29/2022

## 2022-10-03 ENCOUNTER — TELEPHONE (OUTPATIENT)
Dept: INTERNAL MEDICINE | Age: 57
End: 2022-10-03

## 2022-10-03 DIAGNOSIS — I10 HYPERTENSION, UNSPECIFIED TYPE: Primary | ICD-10-CM

## 2022-10-05 RX ORDER — MEDICAL SUPPLY, MISCELLANEOUS
1 EACH MISCELLANEOUS DAILY
Qty: 1 EACH | Refills: 0 | Status: SHIPPED | OUTPATIENT
Start: 2022-10-05 | End: 2022-10-12

## 2022-10-12 ENCOUNTER — OFFICE VISIT (OUTPATIENT)
Dept: INTERNAL MEDICINE | Age: 57
End: 2022-10-12
Payer: COMMERCIAL

## 2022-10-12 VITALS
HEIGHT: 69 IN | HEART RATE: 70 BPM | WEIGHT: 192 LBS | DIASTOLIC BLOOD PRESSURE: 80 MMHG | BODY MASS INDEX: 28.44 KG/M2 | TEMPERATURE: 98.2 F | SYSTOLIC BLOOD PRESSURE: 124 MMHG

## 2022-10-12 DIAGNOSIS — M53.86 SCIATICA ASSOCIATED WITH DISORDER OF LUMBAR SPINE: ICD-10-CM

## 2022-10-12 DIAGNOSIS — Z13.1 SCREENING FOR DIABETES MELLITUS (DM): ICD-10-CM

## 2022-10-12 DIAGNOSIS — F17.200 TOBACCO USE DISORDER: ICD-10-CM

## 2022-10-12 DIAGNOSIS — G44.019 EPISODIC CLUSTER HEADACHE, NOT INTRACTABLE: ICD-10-CM

## 2022-10-12 DIAGNOSIS — Z13.220 SCREENING FOR HYPERLIPIDEMIA: ICD-10-CM

## 2022-10-12 DIAGNOSIS — I10 PRIMARY HYPERTENSION: Primary | ICD-10-CM

## 2022-10-12 PROCEDURE — 4004F PT TOBACCO SCREEN RCVD TLK: CPT

## 2022-10-12 PROCEDURE — 99214 OFFICE O/P EST MOD 30 MIN: CPT

## 2022-10-12 PROCEDURE — G8427 DOCREV CUR MEDS BY ELIG CLIN: HCPCS

## 2022-10-12 PROCEDURE — 99211 OFF/OP EST MAY X REQ PHY/QHP: CPT | Performed by: STUDENT IN AN ORGANIZED HEALTH CARE EDUCATION/TRAINING PROGRAM

## 2022-10-12 PROCEDURE — G8419 CALC BMI OUT NRM PARAM NOF/U: HCPCS

## 2022-10-12 PROCEDURE — 3017F COLORECTAL CA SCREEN DOC REV: CPT

## 2022-10-12 PROCEDURE — G8484 FLU IMMUNIZE NO ADMIN: HCPCS

## 2022-10-12 RX ORDER — SUMATRIPTAN 20 MG/1
1 SPRAY NASAL PRN
Qty: 1 EACH | Refills: 3 | Status: SHIPPED | OUTPATIENT
Start: 2022-10-12 | End: 2023-10-12

## 2022-10-12 ASSESSMENT — ENCOUNTER SYMPTOMS
EYE REDNESS: 1
PHOTOPHOBIA: 0
ABDOMINAL PAIN: 0
NAUSEA: 0
SHORTNESS OF BREATH: 0
CHEST TIGHTNESS: 0
EYE DISCHARGE: 1
COUGH: 0
VOMITING: 0

## 2022-10-12 ASSESSMENT — PATIENT HEALTH QUESTIONNAIRE - PHQ9
SUM OF ALL RESPONSES TO PHQ9 QUESTIONS 1 & 2: 0
SUM OF ALL RESPONSES TO PHQ QUESTIONS 1-9: 0
1. LITTLE INTEREST OR PLEASURE IN DOING THINGS: 0
2. FEELING DOWN, DEPRESSED OR HOPELESS: 0

## 2022-10-12 NOTE — PATIENT INSTRUCTIONS
Your BP is well controlled  Continue taking your norvasc 5 mg daily and lisinopril-hctz 20-25 daily  Check your BP at home  Use imitrex spray once for headache  Repeat the dose once after 2 hrs   No more than 2 doses during 24 hrs    -CARLOS Morales

## 2022-10-12 NOTE — PROGRESS NOTES
[facility]  [facility] [address] [address]  Dept: [phone]  Dept Fax: [phone]    Office Progress/Follow Up Note  Date ofpatient's visit: 10/12/2022  Patient's Name:  Eileen López YOB: 1965            Patient Care Team:  Milan Renee MD as PCP - General (Internal Medicine)  ================================================================    REASON FOR VISIT/CHIEF COMPLAINT:  Hypertension (Medications taken today) and Headache (Cluster headaches -- when pt is laying down after work )    HISTORY OF PRESENTING ILLNESS:  History was obtained from: patient, electronic medical record. Kendal Hall a 59 y.o. is here for follow up of HTN and cluster headache. Patient has HTN. Poorly controlled in past. Doesn't check BP at home. Recent ED visit 9/21 for headache and uncontrolled BP    Newly diagnosed cluster headache. Not started on Triptan due uncontrolled BP. Getting episodes every day. Wakes him up from sleep. Patient has history of back pain. Sits for 8 hrs at work. MRI lumbar spine shows multilevel degenerative disease with b/l foraminal narrowing. Reported little relief with PT and zanflex. Recent colonoscopy 2/25 shows 6 mm pedunculated polyp. Pathology shows tubular adenoma. Follow-up colonoscopy 5-7 years advised     Patient has smoking history, 1/2 PPD for the last 40 years. Denies low-dose chest CT for CT lung screening. Smoking cessation counseling done. Denies vaccination for COVID. Denies Hep C/HIV screen. We have talked to him in detail regarding the importance of screening for diabetes, early diagnosis and prevention of endorgan damage. Also discussed with him importance of lipid screen and its impact on different organs. PHQ 2: neg 9/28     Denies alcohol abuse, marijuana abuse or cocaine abuse. Diagnosis Orders   1. Primary hypertension  Basic Metabolic Panel      2.  Episodic cluster headache, not intractable  SUMAtriptan (IMITREX) 20 MG/ACT nasal spray      3. Sciatica associated with disorder of lumbar spine        4. Tobacco use disorder        5. Screening for diabetes mellitus (DM)  Hemoglobin A1C         Patient Active Problem List   Diagnosis    Neuropathy    Hypertension    Tobacco use disorder    Alcohol use    Sciatica associated with disorder of lumbar spine    Episodic cluster headache, not intractable       Health Maintenance Due   Topic Date Due    Diabetes screen  Never done       No Known Allergies      Current Outpatient Medications   Medication Sig Dispense Refill    SUMAtriptan (IMITREX) 20 MG/ACT nasal spray 1 spray by Nasal route as needed for Migraine 1 each 3    amLODIPine (NORVASC) 5 MG tablet Take 1 tablet by mouth daily 30 tablet 5    butalbital-acetaminophen-caffeine (FIORICET, ESGIC) -40 MG per tablet Take 1 tablet by mouth every 6 hours as needed for Headaches 56 tablet 0    lisinopril-hydroCHLOROthiazide (PRINZIDE;ZESTORETIC) 20-25 MG per tablet Take 1 tablet by mouth daily 30 tablet 1    tiZANidine (ZANAFLEX) 4 MG tablet take 1 tablet by mouth every 8 hours if needed for SCIATICA 30 tablet 1    ibuprofen (ADVIL;MOTRIN) 600 MG tablet Take 1 tablet by mouth every 8 hours as needed for Pain 30 tablet 0     No current facility-administered medications for this visit. Social History     Tobacco Use    Smoking status: Every Day     Packs/day: 0.50     Years: 35.00     Pack years: 17.50     Types: Cigarettes    Smokeless tobacco: Never    Tobacco comments:     pt states he is ready to quit.     Vaping Use    Vaping Use: Never used   Substance Use Topics    Alcohol use: Not Currently     Comment: patient stopped drinking 1 year ago 4/14/21    Drug use: No       Family History   Problem Relation Age of Onset    Other Mother         seizures    High Blood Pressure Mother     High Blood Pressure Father     High Blood Pressure Sister     High Blood Pressure Brother REVIEW OF SYSTEMS:  Review of Systems   Constitutional:  Negative for appetite change, chills and fever. Eyes:  Positive for discharge and redness. Negative for photophobia and visual disturbance. Respiratory:  Negative for cough, chest tightness and shortness of breath. Cardiovascular:  Negative for chest pain and palpitations. Gastrointestinal:  Negative for abdominal pain, nausea and vomiting. Neurological:  Positive for headaches. Negative for numbness. Unilateral, left sided with rhinorrhea and watering of eye. Last for 20 min- 3 hrs      PHYSICAL EXAM:  Vitals:    10/12/22 1356   BP: 124/80   Pulse: 70   Temp: 98.2 °F (36.8 °C)   TempSrc: Temporal   Weight: 192 lb (87.1 kg)   Height: 5' 9\" (1.753 m)     BP Readings from Last 3 Encounters:   10/12/22 124/80   09/28/22 (!) 151/86   09/23/22 (!) 160/90        Physical Exam  Constitutional:       General: He is not in acute distress. Appearance: He is not ill-appearing. Cardiovascular:      Heart sounds: No murmur heard. No friction rub. No gallop. Pulmonary:      Effort: No respiratory distress. Breath sounds: No rales. Chest:      Chest wall: No tenderness. Musculoskeletal:      Cervical back: No rigidity or tenderness. Neurological:      General: No focal deficit present. Mental Status: He is oriented to person, place, and time. Cranial Nerves: No cranial nerve deficit. Sensory: No sensory deficit. Motor: No weakness.    Psychiatric:         Mood and Affect: Mood normal.         Behavior: Behavior normal.         DIAGNOSTIC FINDINGS:  CBC:  Lab Results   Component Value Date/Time    WBC 9.0 09/20/2022 03:45 PM    HGB 13.9 09/20/2022 03:45 PM    PLT See Reflexed IPF Result 09/20/2022 03:45 PM       BMP:    Lab Results   Component Value Date/Time     09/20/2022 03:45 PM    K 3.9 09/20/2022 03:45 PM     09/20/2022 03:45 PM    CO2 23 09/20/2022 03:45 PM    BUN 15 09/20/2022 03:45 PM CREATININE 1.05 09/20/2022 03:45 PM    GLUCOSE 105 09/20/2022 03:45 PM       HEMOGLOBIN A1C: No results found for: LABA1C    FASTING LIPID PANEL:No results found for: CHOL, HDL, TRIG  Assessment and plan    ASSESSMENT AND PLAN:  Amy Junior was seen today for hypertension and headache. Diagnoses and all orders for this visit:    Primary hypertension  - Basic Metabolic Panel; Future  - check you BP at home  - continue on amlodipine 5 and lisinopril-HCTZ 20-25 daily    Episodic cluster headache, not intractable  -     SUMAtriptan (IMITREX) 20 MG/ACT nasal spray; 1 spray by Nasal route as needed for Migraine  - Use imitrex spray once for headache  - Repeat the dose once after 2 hrs   - No more than 2 doses during 24 hrs    Sciatica associated with disorder of lumbar spine      - continue on physical therapy      - Advil for severe pain    Tobacco use disorder       - Advised regarding cessation       - Denies LDCT for lung cancer screening        - risks and benefits discussed    Screening for diabetes mellitus (DM)  -     Hemoglobin A1C; Future       FOLLOW UP AND INSTRUCTIONS:  Return in about 2 months (around 12/12/2022). Amy Junior received counseling on the following healthy behaviors: nutrition, exercise, medication adherence, and tobacco cessation    Discussed use, benefit, and side effects of prescribed medications. Barriers to medication compliance addressed. All patient questions answered. Pt voiced understanding. Patient given educational materials - see patient instructions    Gila Pearson MD  Resident Internal Medicine  [facility].  10/12/2022, 2:34 PM    This note is created with the assistance of a speech-recognition program. While intending to generate a document that actually reflects the content of thevisit, the document can still have some mistakes which may not have been identified and corrected by editing.

## 2022-10-26 ENCOUNTER — HOSPITAL ENCOUNTER (EMERGENCY)
Age: 57
Discharge: HOME | End: 2022-10-26
Attending: EMERGENCY MEDICINE
Payer: OTHER MISCELLANEOUS

## 2022-10-26 VITALS
HEIGHT: 69 IN | BODY MASS INDEX: 28.88 KG/M2 | SYSTOLIC BLOOD PRESSURE: 161 MMHG | OXYGEN SATURATION: 98 % | DIASTOLIC BLOOD PRESSURE: 89 MMHG | TEMPERATURE: 97.5 F | WEIGHT: 195 LBS | RESPIRATION RATE: 18 BRPM | HEART RATE: 86 BPM

## 2022-10-26 DIAGNOSIS — S16.1XXA ACUTE STRAIN OF NECK MUSCLE, INITIAL ENCOUNTER: Primary | ICD-10-CM

## 2022-10-26 DIAGNOSIS — M48.062 SPINAL STENOSIS OF LUMBAR REGION WITH NEUROGENIC CLAUDICATION: ICD-10-CM
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
## 2022-10-26 PROCEDURE — 96372 THER/PROPH/DIAG INJ SC/IM: CPT

## 2022-10-26 PROCEDURE — 99284 EMERGENCY DEPT VISIT MOD MDM: CPT

## 2022-10-26 PROCEDURE — 6360000002 HC RX W HCPCS: Performed by: STUDENT IN AN ORGANIZED HEALTH CARE EDUCATION/TRAINING PROGRAM

## 2022-10-26 PROCEDURE — 6370000000 HC RX 637 (ALT 250 FOR IP): Performed by: STUDENT IN AN ORGANIZED HEALTH CARE EDUCATION/TRAINING PROGRAM

## 2022-10-26 RX ORDER — IBUPROFEN 600 MG/1
600 TABLET ORAL EVERY 8 HOURS PRN
Qty: 30 TABLET | Refills: 0 | Status: SHIPPED | OUTPATIENT
Start: 2022-10-26

## 2022-10-26 RX ORDER — CYCLOBENZAPRINE HCL 5 MG
5 TABLET ORAL 2 TIMES DAILY PRN
Qty: 10 TABLET | Refills: 0 | Status: SHIPPED | OUTPATIENT
Start: 2022-10-26 | End: 2022-11-05

## 2022-10-26 RX ORDER — KETOROLAC TROMETHAMINE 30 MG/ML
30 INJECTION, SOLUTION INTRAMUSCULAR; INTRAVENOUS ONCE
Status: COMPLETED | OUTPATIENT
Start: 2022-10-26 | End: 2022-10-26

## 2022-10-26 RX ORDER — LIDOCAINE 4 G/G
1 PATCH TOPICAL DAILY
Status: DISCONTINUED | OUTPATIENT
Start: 2022-10-26 | End: 2022-10-26 | Stop reason: HOSPADM

## 2022-10-26 RX ORDER — ACETAMINOPHEN 500 MG
1000 TABLET ORAL 3 TIMES DAILY
Qty: 30 TABLET | Refills: 0 | Status: SHIPPED | OUTPATIENT
Start: 2022-10-26

## 2022-10-26 RX ORDER — ACETAMINOPHEN 500 MG
1000 TABLET ORAL ONCE
Status: COMPLETED | OUTPATIENT
Start: 2022-10-26 | End: 2022-10-26

## 2022-10-26 RX ADMIN — KETOROLAC TROMETHAMINE 30 MG: 30 INJECTION, SOLUTION INTRAMUSCULAR at 08:48

## 2022-10-26 RX ADMIN — ACETAMINOPHEN 1000 MG: 500 TABLET ORAL at 08:42

## 2022-10-26 ASSESSMENT — ENCOUNTER SYMPTOMS
NAUSEA: 0
VOMITING: 0
BACK PAIN: 0
SHORTNESS OF BREATH: 0
ABDOMINAL PAIN: 0

## 2022-10-26 ASSESSMENT — PAIN DESCRIPTION - LOCATION: LOCATION: NECK

## 2022-10-26 ASSESSMENT — PAIN SCALES - GENERAL
PAINLEVEL_OUTOF10: 8
PAINLEVEL_OUTOF10: 8

## 2022-10-26 ASSESSMENT — PAIN - FUNCTIONAL ASSESSMENT: PAIN_FUNCTIONAL_ASSESSMENT: 0-10
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
## 2022-10-26 NOTE — Clinical Note
Agus Barroso was seen and treated in our emergency department on 10/26/2022. He may return to work on 10/28/2022. If you have any questions or concerns, please don't hesitate to call.       Kelley Farnz MD
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
## 2022-11-21 DIAGNOSIS — I10 UNCONTROLLED HYPERTENSION: ICD-10-CM

## 2022-11-21 NOTE — TELEPHONE ENCOUNTER
Refill request for lisinopril-hydroCHLOROthiazide (PRINZIDE;ZESTORETIC) 20-25 MG per tablet. If appropriate please send medication(s) to patients pharmacy.     Next appt: 12/14/2022    Health Maintenance   Topic Date Due    Diabetes screen  Never done    DTaP/Tdap/Td vaccine (1 - Tdap) 01/12/2023 (Originally 5/7/1982)    Shingles vaccine (1 of 2) 01/12/2023 (Originally 5/7/2013)    Flu vaccine (1) 09/28/2023 (Originally 8/1/2022)    Pneumococcal 0-64 years Vaccine (1 - PCV) 09/28/2023 (Originally 5/7/1969)    COVID-19 Vaccine (1) 09/28/2023 (Originally 1965)    Hepatitis C screen  09/28/2023 (Originally 5/7/1981)    HIV screen  09/28/2023 (Originally 5/7/1978)    Lipids  10/03/2023 (Originally 5/7/2003)    Depression Screen  10/12/2023    Colorectal Cancer Screen  02/25/2032    Hepatitis A vaccine  Aged Out    Hib vaccine  Aged Out    Meningococcal (ACWY) vaccine  Aged Out       No results found for: LABA1C          ( goal A1C is < 7)   No results found for: LABMICR  No results found for: LDLCHOLESTEROL, LDLCALC    (goal LDL is <100)   BUN (mg/dL)   Date Value   09/20/2022 15     BP Readings from Last 3 Encounters:   10/26/22 (!) 161/89   10/12/22 124/80   09/28/22 (!) 151/86          (goal 120/80)          Patient Active Problem List:     Neuropathy     Hypertension     Tobacco use disorder     Alcohol use     Sciatica associated with disorder of lumbar spine     Episodic cluster headache, not intractable

## 2022-11-22 RX ORDER — LISINOPRIL AND HYDROCHLOROTHIAZIDE 25; 20 MG/1; MG/1
TABLET ORAL
Qty: 90 TABLET | Refills: 1 | Status: SHIPPED | OUTPATIENT
Start: 2022-11-22

## 2022-12-14 DIAGNOSIS — M53.86 SCIATICA ASSOCIATED WITH DISORDER OF LUMBAR SPINE: Primary | ICD-10-CM

## 2022-12-14 RX ORDER — CYCLOBENZAPRINE HCL 5 MG
5 TABLET ORAL 2 TIMES DAILY PRN
Qty: 30 TABLET | Refills: 0 | Status: SHIPPED | OUTPATIENT
Start: 2022-12-14 | End: 2022-12-29

## 2022-12-14 NOTE — TELEPHONE ENCOUNTER
Request for flexeril. Please review and e-scribe to pharmacy listed in chart if appropriate. Thank you.       Next Visit Date: 1/11/2023      Future Appointments   Date Time Provider Cassius Arevalo   1/11/2023  3:00 PM Kailey White MD [address] [facility]   Topic Date Due    Diabetes screen  Never done    DTaP/Tdap/Td vaccine (1 - Tdap) 01/12/2023 (Originally 5/7/1982)    Shingles vaccine (1 of 2) 01/12/2023 (Originally 5/7/2013)    Flu vaccine (1) 09/28/2023 (Originally 8/1/2022)    Pneumococcal 0-64 years Vaccine (1 - PCV) 09/28/2023 (Originally 5/7/1969)    COVID-19 Vaccine (1) 09/28/2023 (Originally 1965)    Hepatitis C screen  09/28/2023 (Originally 5/7/1981)    HIV screen  09/28/2023 (Originally 5/7/1978)    Lipids  10/03/2023 (Originally 5/7/2003)    Depression Screen  10/12/2023    Colorectal Cancer Screen  02/25/2032    Hepatitis A vaccine  Aged Out    Hib vaccine  Aged Out    Meningococcal (ACWY) vaccine  Aged Out       No results found for: LABA1C          ( goal A1C is < 7)   No results found for: LABMICR  No results found for: LDLCHOLESTEROL, [address]    (goal LDL is <100)   BUN (mg/dL)   Date Value   09/20/2022 15     BP Readings from Last 3 Encounters:   10/26/22 (!) 161/89   10/12/22 124/80   09/28/22 (!) 151/86          (goal 120/80)    All Future Testing planned in [facility]  Lab Frequency Next Occurrence   COVID-19 Once 02/17/2022   Hemoglobin A1C Once 38/15/9061   Basic Metabolic Panel Once 10/02/0923         Patient Active Problem List:     Neuropathy     Hypertension     Tobacco use disorder     Alcohol use     Sciatica associated with disorder of lumbar spine     Episodic cluster headache, not intractable

## 2023-01-24 ENCOUNTER — TELEPHONE (OUTPATIENT)
Dept: INTERNAL MEDICINE | Age: 58
End: 2023-01-24

## 2023-01-24 NOTE — TELEPHONE ENCOUNTER
Pt sent a message to the office requesting a retend  his FMLA, pt want a call to see how he can get this approve, call back # [phone].
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
## 2023-01-25 ENCOUNTER — TELEPHONE (OUTPATIENT)
Dept: INTERNAL MEDICINE | Age: 58
End: 2023-01-25

## 2023-01-25 NOTE — TELEPHONE ENCOUNTER
----- Message from Taj Butt sent at 1/23/2023  3:08 PM EST -----  Subject: Message to Provider    QUESTIONS  Information for Provider? URGENT? Please give patient a call about   extending his FMLA. Please call asap to discuss how to get approval.  ---------------------------------------------------------------------------  --------------  Laura BOUCHER  [phone]; OK to leave message on voicemail  ---------------------------------------------------------------------------  --------------  SCRIPT ANSWERS  Relationship to Patient?  Self

## 2023-01-25 NOTE — TELEPHONE ENCOUNTER
Please advise patient he will have to discuss forms 1/31/2023 at office visit.  PC to patient no answer LM to contact office

## 2023-01-25 NOTE — TELEPHONE ENCOUNTER
Patient needs to seen in office for FMLA form to be signed. Tried to call patient. Left voice message.  Thanks

## 2023-01-31 ENCOUNTER — OFFICE VISIT (OUTPATIENT)
Dept: INTERNAL MEDICINE | Age: 58
End: 2023-01-31
Payer: COMMERCIAL

## 2023-01-31 VITALS
OXYGEN SATURATION: 97 % | HEART RATE: 71 BPM | SYSTOLIC BLOOD PRESSURE: 122 MMHG | BODY MASS INDEX: 28.38 KG/M2 | WEIGHT: 191.6 LBS | DIASTOLIC BLOOD PRESSURE: 80 MMHG | HEIGHT: 69 IN

## 2023-01-31 DIAGNOSIS — F17.200 TOBACCO USE DISORDER: ICD-10-CM

## 2023-01-31 DIAGNOSIS — G44.019 EPISODIC CLUSTER HEADACHE, NOT INTRACTABLE: ICD-10-CM

## 2023-01-31 DIAGNOSIS — Z78.9 ALCOHOL USE: ICD-10-CM

## 2023-01-31 DIAGNOSIS — M48.062 SPINAL STENOSIS OF LUMBAR REGION WITH NEUROGENIC CLAUDICATION: ICD-10-CM

## 2023-01-31 DIAGNOSIS — M53.86 SCIATICA ASSOCIATED WITH DISORDER OF LUMBAR SPINE: Primary | ICD-10-CM

## 2023-01-31 DIAGNOSIS — I10 PRIMARY HYPERTENSION: ICD-10-CM

## 2023-01-31 PROCEDURE — G8427 DOCREV CUR MEDS BY ELIG CLIN: HCPCS

## 2023-01-31 PROCEDURE — 3017F COLORECTAL CA SCREEN DOC REV: CPT

## 2023-01-31 PROCEDURE — 4004F PT TOBACCO SCREEN RCVD TLK: CPT

## 2023-01-31 PROCEDURE — 3074F SYST BP LT 130 MM HG: CPT

## 2023-01-31 PROCEDURE — G8419 CALC BMI OUT NRM PARAM NOF/U: HCPCS

## 2023-01-31 PROCEDURE — 3078F DIAST BP <80 MM HG: CPT

## 2023-01-31 PROCEDURE — G8484 FLU IMMUNIZE NO ADMIN: HCPCS

## 2023-01-31 PROCEDURE — 99213 OFFICE O/P EST LOW 20 MIN: CPT

## 2023-01-31 RX ORDER — TIZANIDINE 4 MG/1
TABLET ORAL
Qty: 30 TABLET | Refills: 1 | Status: CANCELLED | OUTPATIENT
Start: 2023-01-31

## 2023-01-31 ASSESSMENT — ENCOUNTER SYMPTOMS
DIARRHEA: 0
BLOOD IN STOOL: 0
ABDOMINAL DISTENTION: 0
COLOR CHANGE: 0
COUGH: 0
CONSTIPATION: 0
CHEST TIGHTNESS: 0
NAUSEA: 0
APNEA: 0
ABDOMINAL PAIN: 0
SHORTNESS OF BREATH: 0
VOMITING: 0
BACK PAIN: 0

## 2023-01-31 ASSESSMENT — PATIENT HEALTH QUESTIONNAIRE - PHQ9
1. LITTLE INTEREST OR PLEASURE IN DOING THINGS: 0
SUM OF ALL RESPONSES TO PHQ QUESTIONS 1-9: 0
SUM OF ALL RESPONSES TO PHQ QUESTIONS 1-9: 0
DEPRESSION UNABLE TO ASSESS: PT REFUSES
SUM OF ALL RESPONSES TO PHQ QUESTIONS 1-9: 0
2. FEELING DOWN, DEPRESSED OR HOPELESS: 0
SUM OF ALL RESPONSES TO PHQ QUESTIONS 1-9: 0
SUM OF ALL RESPONSES TO PHQ9 QUESTIONS 1 & 2: 0

## 2023-01-31 NOTE — PROGRESS NOTES
Attending Physician Statement  I have discussed the care of Allyn Calvo, including pertinent history and exam findings with the resident. I have reviewed the key elements of all parts of the encounter with the resident I agree with the assessment, and status of the problem list as documented and this was also documented by the resident. The medication list was reviewed with the resident and is up to date. The return visit should be in 3 months . Diagnosis Orders   1. Sciatica associated with disorder of lumbar spine        2. Spinal stenosis of lumbar region with neurogenic claudication  diclofenac sodium (VOLTAREN) 1 % GEL      3. Primary hypertension        4. Episodic cluster headache, not intractable        5. Tobacco use disorder        6.  Alcohol use             Samina Arredondo MD   Attending Physician, [address] [place] [address], Internal Medicine Residency Program  [address] [place] [address]

## 2023-01-31 NOTE — PROGRESS NOTES
[facility] [address] [address]  Dept: [phone]  Dept Fax: [phone]    Office Progress/Follow Up Note  Date ofpatient's visit: 1/31/2023  Patient's Name:  Sharon Acevedo YOB: 1965            Patient Care Team:  Lobito Mcgill MD as PCP - General (Internal Medicine)  ================================================================    REASON FOR VISIT/CHIEF COMPLAINT:  Follow-up (2mo), Hypertension, Health Maintenance (Refused shingles and DTAP), and Back Pain    HISTORY OF PRESENTING ILLNESS:  History was obtained from: patient, electronic medical record. Lucinda Scale a 59 y.o. is here for a follow-up visit and signing FMLA form. Patient has history of sciatica, MRI lumbar spine shows multilevel degenerative disease with bilateral foraminal narrowing. L3-L4 severe stenosis. Evaluated by neurosurgery in the past, recommended multimodal conservative management. Complaining of back pain 4-5/10 in intensity, more severe in the center, radiating to left lower extremity. Patient has worked with a physical therapy, doing exercises at home. Physical therapy and Motrin provid moderate relief. Patient works 8 to 12 hours a day. Sits most of the time. Gets 25 to 30-minute lunch break. Some days his pain is so severe that he could not go to work. Patient has a history of hypertension. Does not check his blood pressure regularly at home. He is compliant with his medications. Blood pressure in the office today is 122/80. Patient has history of cluster headache, well controlled with Imitrex nasal spray. No episode reported during the past 1 month. Recent colonoscopy 2/25 shows 6 mm pedunculated polyp. Pathology shows tubular adenoma. Follow-up colonoscopy 5-7 years advised     Patient has smoking history, 1/2 PPD for the last 40 years. Denies low-dose chest CT for CT lung screening.   Smoking cessation counseling done.     Denies vaccination for COVID. Denies Hep C/HIV screen. We have talked to him in detail regarding the importance of screening for diabetes, early diagnosis and prevention of endorgan damage. Also discussed with him importance of lipid screen and its impact on different organs. PHQ 2: 3     Denies alcohol abuse, marijuana abuse or cocaine abuse. Diagnosis Orders   1. Sciatica associated with disorder of lumbar spine        2. Spinal stenosis of lumbar region with neurogenic claudication  diclofenac sodium (VOLTAREN) 1 % GEL      3. Primary hypertension        4. Episodic cluster headache, not intractable        5. Tobacco use disorder        6.  Alcohol use           Patient Active Problem List   Diagnosis    Neuropathy    Hypertension    Tobacco use disorder    Alcohol use    Sciatica associated with disorder of lumbar spine    Episodic cluster headache, not intractable    Spinal stenosis of lumbar region with neurogenic claudication       Health Maintenance Due   Topic Date Due    Diabetes screen  Never done       No Known Allergies      Current Outpatient Medications   Medication Sig Dispense Refill    diclofenac sodium (VOLTAREN) 1 % GEL Apply 4 g topically 4 times daily 100 g 1    lisinopril-hydroCHLOROthiazide (PRINZIDE;ZESTORETIC) 20-25 MG per tablet take 1 tablet by mouth once daily 90 tablet 1    ibuprofen (ADVIL;MOTRIN) 600 MG tablet Take 1 tablet by mouth every 8 hours as needed for Pain 30 tablet 0    acetaminophen (TYLENOL) 500 MG tablet Take 2 tablets by mouth 3 times daily 30 tablet 0    amLODIPine (NORVASC) 5 MG tablet Take 1 tablet by mouth daily 30 tablet 5    tiZANidine (ZANAFLEX) 4 MG tablet take 1 tablet by mouth every 8 hours if needed for SCIATICA 30 tablet 1    SUMAtriptan (IMITREX) 20 MG/ACT nasal spray 1 spray by Nasal route as needed for Migraine (Patient not taking: Reported on 1/31/2023) 1 each 3    butalbital-acetaminophen-caffeine (FIORICET, ESGIC) -40 MG per tablet Take 1 tablet by mouth every 6 hours as needed for Headaches (Patient not taking: Reported on 1/31/2023) 56 tablet 0     No current facility-administered medications for this visit. Social History     Tobacco Use    Smoking status: Every Day     Packs/day: 0.50     Years: 35.00     Pack years: 17.50     Types: Cigarettes    Smokeless tobacco: Never    Tobacco comments:     pt states he is ready to quit. Vaping Use    Vaping Use: Never used   Substance Use Topics    Alcohol use: Not Currently     Comment: patient stopped drinking 1 year ago 4/14/21    Drug use: No       Family History   Problem Relation Age of Onset    Other Mother         seizures    High Blood Pressure Mother     High Blood Pressure Father     High Blood Pressure Sister     High Blood Pressure Brother         REVIEW OF SYSTEMS:  Review of Systems   Constitutional:  Negative for chills, fatigue and fever. Respiratory:  Negative for apnea, cough, chest tightness and shortness of breath. Cardiovascular:  Negative for chest pain, palpitations and leg swelling. Gastrointestinal:  Negative for abdominal distention, abdominal pain, blood in stool, constipation, diarrhea, nausea and vomiting. Genitourinary:  Negative for dysuria, flank pain, frequency, testicular pain and urgency. Musculoskeletal:  Negative for arthralgias, back pain and myalgias. Skin:  Negative for color change, pallor and rash. Neurological:  Negative for dizziness, weakness and headaches. Psychiatric/Behavioral:  Negative for sleep disturbance. The patient is not nervous/anxious.       PHYSICAL EXAM:  Vitals:    01/31/23 1302 01/31/23 1308   BP: (!) 142/90 122/80   Site: Right Upper Arm Left Upper Arm   Position: Sitting Sitting   Cuff Size: Large Adult Medium Adult   Pulse: 71    SpO2: 97%    Weight: 191 lb 9.6 oz (86.9 kg)    Height: 5' 9\" (1.753 m)      BP Readings from Last 3 Encounters:   01/31/23 122/80   10/26/22 (!) 161/89   10/12/22 124/80 Physical Exam  Constitutional:       General: He is not in acute distress. Appearance: He is not ill-appearing. Cardiovascular:      Rate and Rhythm: Normal rate and regular rhythm. Pulses: Normal pulses. Heart sounds: Normal heart sounds. Pulmonary:      Effort: Pulmonary effort is normal.      Breath sounds: Normal breath sounds. Abdominal:      General: Abdomen is flat. Bowel sounds are normal.      Palpations: Abdomen is soft. Musculoskeletal:         General: Normal range of motion. Right lower leg: No edema. Left lower leg: No edema. Skin:     General: Skin is warm and dry. Neurological:      General: No focal deficit present. Mental Status: He is oriented to person, place, and time. Psychiatric:         Mood and Affect: Mood normal.         Behavior: Behavior normal.         Thought Content: Thought content normal.       DIAGNOSTIC FINDINGS:  CBC:  Lab Results   Component Value Date/Time    WBC 9.0 09/20/2022 03:45 PM    HGB 13.9 09/20/2022 03:45 PM    PLT See Reflexed IPF Result 09/20/2022 03:45 PM       BMP:    Lab Results   Component Value Date/Time     09/20/2022 03:45 PM    K 3.9 09/20/2022 03:45 PM     09/20/2022 03:45 PM    CO2 23 09/20/2022 03:45 PM    BUN 15 09/20/2022 03:45 PM    CREATININE 1.05 09/20/2022 03:45 PM    GLUCOSE 105 09/20/2022 03:45 PM       HEMOGLOBIN A1C: No results found for: LABA1C    FASTING LIPID PANEL:No results found for: CHOL, HDL, TRIG    ASSESSMENT AND PLAN:  Ricky Schwartz was seen today for follow-up, hypertension, health maintenance and back pain. Diagnoses and all orders for this visit:    Sciatica associated with disorder of lumbar spine  Spinal stenosis of lumbar region with neurogenic claudication  -Continue on physical therapy  -Motrin/Tylenol as needed for pain  -Voltaren gel, apply topically as needed 4 times daily  -We will sign FMLA form.   Can take 2 days off per month due to severe pain    Primary hypertension  -Well-controlled  -Check your blood pressure regularly at home  -Continue on amlodipine 5 daily and lisinopril-HCTZ 20-25 daily    Episodic cluster headache, not intractable  -Well-controlled  - Use imitrex spray once for headache  - Repeat the dose once after 2 hrs   - No more than 2 doses during 24 hrs    Tobacco use disorder  -Refuses low-dose CT chest for lung cancer screening  -Recommended cessation    FOLLOW UP AND INSTRUCTIONS:  Return in about 3 months (around 4/30/2023). Brittanisarah Hayden received counseling on the following healthy behaviors: exercise, medication adherence, and tobacco cessation    Discussed use, benefit, and side effects of prescribed medications. Barriers to medication compliance addressed. All patient questions answered. Pt voiced understanding. Patient given educational materials - see patient instructions    Roxanne Werner MD  Resident Internal Medicine  [facility].  1/31/2023, 1:38 PM    This note is created with the assistance of a speech-recognition program. While intending to generate a document that actually reflects the content of thevisit, the document can still have some mistakes which may not have been identified and corrected by editing.

## 2023-04-24 ENCOUNTER — TELEPHONE (OUTPATIENT)
Dept: INTERNAL MEDICINE | Age: 58
End: 2023-04-24

## 2023-04-24 DIAGNOSIS — I10 PRIMARY HYPERTENSION: ICD-10-CM

## 2023-04-25 RX ORDER — AMLODIPINE BESYLATE 5 MG/1
TABLET ORAL
Qty: 30 TABLET | Refills: 5 | Status: SHIPPED | OUTPATIENT
Start: 2023-04-25

## 2023-05-05 DIAGNOSIS — I10 UNCONTROLLED HYPERTENSION: ICD-10-CM

## 2023-05-05 DIAGNOSIS — M53.86 SCIATICA ASSOCIATED WITH DISORDER OF LUMBAR SPINE: ICD-10-CM

## 2023-05-05 DIAGNOSIS — M53.86 SCIATICA ASSOCIATED WITH DISORDER OF LUMBAR SPINE: Primary | ICD-10-CM

## 2023-05-06 RX ORDER — LISINOPRIL AND HYDROCHLOROTHIAZIDE 25; 20 MG/1; MG/1
1 TABLET ORAL DAILY
Qty: 90 TABLET | Refills: 0 | Status: SHIPPED | OUTPATIENT
Start: 2023-05-06

## 2023-05-06 RX ORDER — CYCLOBENZAPRINE HCL 5 MG
5 TABLET ORAL 2 TIMES DAILY PRN
Qty: 10 TABLET | Refills: 0 | Status: CANCELLED | OUTPATIENT
Start: 2023-05-06 | End: 2023-05-16

## 2023-05-06 RX ORDER — TIZANIDINE 4 MG/1
4 TABLET ORAL NIGHTLY PRN
Qty: 30 TABLET | Refills: 0 | Status: SHIPPED | OUTPATIENT
Start: 2023-05-06 | End: 2023-06-05

## 2023-05-08 RX ORDER — CYCLOBENZAPRINE HCL 5 MG
TABLET ORAL
Qty: 30 TABLET | Refills: 5 | OUTPATIENT
Start: 2023-05-08

## 2023-08-17 DIAGNOSIS — M53.86 SCIATICA ASSOCIATED WITH DISORDER OF LUMBAR SPINE: ICD-10-CM

## 2023-08-17 RX ORDER — TIZANIDINE 4 MG/1
TABLET ORAL
Qty: 30 TABLET | Refills: 4 | Status: SHIPPED | OUTPATIENT
Start: 2023-08-17

## 2023-08-17 NOTE — TELEPHONE ENCOUNTER
Franco Thurman is calling to request a refill on the following medication(s):    Medication Request:  Requested Prescriptions     Pending Prescriptions Disp Refills    tiZANidine (ZANAFLEX) 4 MG tablet [Pharmacy Med Name: TIZANIDINE HCL 4 MG TABLET] 30 tablet 0     Sig: take 1 tablet by mouth every evening if needed for back pain       Last Visit Date (If Applicable):  8/78/2528    Next Visit Date:    Visit date not found

## 2023-08-28 DIAGNOSIS — I10 UNCONTROLLED HYPERTENSION: ICD-10-CM

## 2023-08-29 RX ORDER — LISINOPRIL AND HYDROCHLOROTHIAZIDE 25; 20 MG/1; MG/1
TABLET ORAL
Qty: 90 TABLET | Refills: 1 | Status: SHIPPED | OUTPATIENT
Start: 2023-08-29 | End: 2023-09-27 | Stop reason: SDUPTHER

## 2023-08-29 NOTE — TELEPHONE ENCOUNTER
Pharmacy requesting refills for Lisinopril-HCTZ 20-25mg tablets. Please review and e-scribe to pharmacy listed in chart if appropriate. Thank you. Next Visit Date: 9/27/23  Last Visit Date: 1/31/23    No future appointments.     Health Maintenance   Topic Date Due    Diabetes screen  Never done    Flu vaccine (1) Never done    Pneumococcal 0-64 years Vaccine (1 - PCV) 09/28/2023 (Originally [birth year]7/1969)    COVID-19 Vaccine (1) 09/28/2023 (Originally 1965)    Hepatitis C screen  09/28/2023 (Originally 5/7/1981)    HIV screen  09/28/2023 (Originally 5/7/1978)    Lipids  10/03/2023 (Originally 5/7/2003)    DTaP/Tdap/Td vaccine (1 - Tdap) 01/31/2024 (Originally 5/7/1982)    Shingles vaccine (1 of 2) 01/31/2024 (Originally 5/7/2013)    Depression Screen  01/31/2024    Colorectal Cancer Screen  02/25/2032    Hepatitis A vaccine  Aged Out    Hib vaccine  Aged Out    Meningococcal (ACWY) vaccine  Aged Out    Prostate Specific Antigen (PSA) Screening or Monitoring  Discontinued       No results found for: LABA1C          ( goal A1C is < 7)   No components found for: LABMICR  No results found for: LDLCHOLESTEROL, LDLCALC    (goal LDL is <100)   BUN (mg/dL)   Date Value   09/20/2022 15     BP Readings from Last 3 Encounters:   01/31/23 122/80   10/26/22 (!) 161/89   10/12/22 124/80          (goal 120/80)    All Future Testing planned in [facility]  Lab Frequency Next Occurrence   Hemoglobin A1C Once 40/86/2367   Basic Metabolic Panel Once 60/76/2946         Patient Active Problem List:     Neuropathy     Hypertension     Tobacco use disorder     Alcohol use     Sciatica associated with disorder of lumbar spine     Episodic cluster headache, not intractable     Spinal stenosis of lumbar region with neurogenic claudication

## 2023-09-27 DIAGNOSIS — I10 UNCONTROLLED HYPERTENSION: Primary | ICD-10-CM

## 2023-09-27 RX ORDER — LISINOPRIL AND HYDROCHLOROTHIAZIDE 25; 20 MG/1; MG/1
1 TABLET ORAL DAILY
Qty: 90 TABLET | Refills: 1 | Status: SHIPPED | OUTPATIENT
Start: 2023-09-27

## 2023-09-27 NOTE — TELEPHONE ENCOUNTER
----- Message from Erum Rivera sent at 9/27/2023 10:05 AM EDT -----  Subject: Refill Request    QUESTIONS  Name of Medication? lisinopril-hydroCHLOROthiazide (PRINZIDE;ZESTORETIC)   20-25 MG per tablet  Patient-reported dosage and instructions? 20-25 MG tablet 1 daily   How many days do you have left? 1  Preferred Pharmacy? [address] [place] [address] [id number]  Pharmacy phone number (if available)? [phone]  Additional Information for Provider? Patient has follow up scheduled for   10/18. Had to reschedule appt due to illness 9/27  ---------------------------------------------------------------------------  --------------  CALL BACK INFO  What is the best way for the office to contact you? OK to leave message on   voicemail  Preferred Call Back Phone Number? [phone]  ---------------------------------------------------------------------------  --------------  SCRIPT ANSWERS  Relationship to Patient?  Self

## 2023-10-18 ENCOUNTER — OFFICE VISIT (OUTPATIENT)
Dept: INTERNAL MEDICINE | Age: 58
End: 2023-10-18
Payer: COMMERCIAL

## 2023-10-18 VITALS
WEIGHT: 187 LBS | DIASTOLIC BLOOD PRESSURE: 89 MMHG | TEMPERATURE: 97.9 F | HEIGHT: 69 IN | BODY MASS INDEX: 27.7 KG/M2 | OXYGEN SATURATION: 98 % | SYSTOLIC BLOOD PRESSURE: 138 MMHG | HEART RATE: 78 BPM

## 2023-10-18 DIAGNOSIS — F17.200 TOBACCO USE DISORDER: ICD-10-CM

## 2023-10-18 DIAGNOSIS — G44.019 EPISODIC CLUSTER HEADACHE, NOT INTRACTABLE: ICD-10-CM

## 2023-10-18 DIAGNOSIS — M53.86 SCIATICA ASSOCIATED WITH DISORDER OF LUMBAR SPINE: ICD-10-CM

## 2023-10-18 DIAGNOSIS — I10 PRIMARY HYPERTENSION: Primary | ICD-10-CM

## 2023-10-18 PROCEDURE — 3078F DIAST BP <80 MM HG: CPT

## 2023-10-18 PROCEDURE — 3074F SYST BP LT 130 MM HG: CPT

## 2023-10-18 PROCEDURE — 3017F COLORECTAL CA SCREEN DOC REV: CPT

## 2023-10-18 PROCEDURE — 4004F PT TOBACCO SCREEN RCVD TLK: CPT

## 2023-10-18 PROCEDURE — G8484 FLU IMMUNIZE NO ADMIN: HCPCS

## 2023-10-18 PROCEDURE — G8419 CALC BMI OUT NRM PARAM NOF/U: HCPCS

## 2023-10-18 PROCEDURE — G8427 DOCREV CUR MEDS BY ELIG CLIN: HCPCS

## 2023-10-18 PROCEDURE — 99213 OFFICE O/P EST LOW 20 MIN: CPT

## 2023-10-18 RX ORDER — AMLODIPINE BESYLATE 5 MG/1
5 TABLET ORAL DAILY
Qty: 90 TABLET | Refills: 1 | Status: SHIPPED | OUTPATIENT
Start: 2023-10-18

## 2023-10-18 SDOH — ECONOMIC STABILITY: FOOD INSECURITY: WITHIN THE PAST 12 MONTHS, THE FOOD YOU BOUGHT JUST DIDN'T LAST AND YOU DIDN'T HAVE MONEY TO GET MORE.: NEVER TRUE

## 2023-10-18 SDOH — ECONOMIC STABILITY: INCOME INSECURITY: HOW HARD IS IT FOR YOU TO PAY FOR THE VERY BASICS LIKE FOOD, HOUSING, MEDICAL CARE, AND HEATING?: NOT HARD AT ALL

## 2023-10-18 SDOH — ECONOMIC STABILITY: FOOD INSECURITY: WITHIN THE PAST 12 MONTHS, YOU WORRIED THAT YOUR FOOD WOULD RUN OUT BEFORE YOU GOT MONEY TO BUY MORE.: NEVER TRUE

## 2023-10-18 SDOH — ECONOMIC STABILITY: HOUSING INSECURITY
IN THE LAST 12 MONTHS, WAS THERE A TIME WHEN YOU DID NOT HAVE A STEADY PLACE TO SLEEP OR SLEPT IN A SHELTER (INCLUDING NOW)?: NO

## 2023-10-19 NOTE — PROGRESS NOTES
Attending Physician Statement  I have discussed the care of Ale Burn, including pertinent history and exam findings,  with the resident. I have reviewed the key elements of all parts of the encounter with the resident. I agree with the assessment, plan and orders as documented by the resident.   (GE Modifier)
and Rhythm: Normal rate and regular rhythm. Pulses: Normal pulses. Heart sounds: Normal heart sounds. Pulmonary:      Effort: Pulmonary effort is normal.      Breath sounds: Normal breath sounds. Abdominal:      General: Abdomen is flat. Bowel sounds are normal.      Palpations: Abdomen is soft. Musculoskeletal:         General: Normal range of motion. Right lower leg: No edema. Left lower leg: No edema. Skin:     General: Skin is warm and dry. Neurological:      General: No focal deficit present. Mental Status: He is oriented to person, place, and time. Psychiatric:         Mood and Affect: Mood normal.         Behavior: Behavior normal.         Thought Content: Thought content normal.     DIAGNOSTIC FINDINGS:  CBC:  Lab Results   Component Value Date/Time    WBC 9.0 09/20/2022 03:45 PM    HGB 13.9 09/20/2022 03:45 PM    PLT See Reflexed IPF Result 09/20/2022 03:45 PM       BMP:    Lab Results   Component Value Date/Time     09/20/2022 03:45 PM    K 3.9 09/20/2022 03:45 PM     09/20/2022 03:45 PM    CO2 23 09/20/2022 03:45 PM    BUN 15 09/20/2022 03:45 PM    CREATININE 1.05 09/20/2022 03:45 PM    GLUCOSE 105 09/20/2022 03:45 PM       HEMOGLOBIN A1C: No results found for: \"LABA1C\"    FASTING LIPID PANEL:No results found for: \"CHOL\", \"HDL\", \"TRIG\"    ASSESSMENT AND PLAN:  Cande Mcintyre was seen today for hypertension.     Diagnoses and all orders for this visit:     Primary hypertension  -Well-controlled  -Check your blood pressure regularly at home  -Continue on amlodipine 5 daily and lisinopril-HCTZ 20-25 daily  -Advised him to get BMP, patient refused    Sciatica associated with disorder of lumbar spine  Spinal stenosis of lumbar region with neurogenic claudication  -Continue on physical therapy  -Motrin/Tylenol as needed for pain  -Voltaren gel, apply topically as needed 4 times daily     Episodic cluster headache, not intractable  -Well-controlled  -No episode for the past

## 2024-01-29 ENCOUNTER — TELEPHONE (OUTPATIENT)
Dept: INTERNAL MEDICINE | Age: 59
End: 2024-01-29

## 2024-01-31 ENCOUNTER — OFFICE VISIT (OUTPATIENT)
Dept: INTERNAL MEDICINE | Age: 59
End: 2024-01-31
Payer: COMMERCIAL

## 2024-01-31 DIAGNOSIS — G62.9 NEUROPATHY: ICD-10-CM

## 2024-01-31 DIAGNOSIS — F17.200 TOBACCO USE DISORDER: ICD-10-CM

## 2024-01-31 DIAGNOSIS — M48.062 SPINAL STENOSIS OF LUMBAR REGION WITH NEUROGENIC CLAUDICATION: ICD-10-CM

## 2024-01-31 DIAGNOSIS — I10 PRIMARY HYPERTENSION: Primary | ICD-10-CM

## 2024-01-31 DIAGNOSIS — G44.019 EPISODIC CLUSTER HEADACHE, NOT INTRACTABLE: ICD-10-CM

## 2024-01-31 PROCEDURE — 99213 OFFICE O/P EST LOW 20 MIN: CPT

## 2024-01-31 ASSESSMENT — PATIENT HEALTH QUESTIONNAIRE - PHQ9
SUM OF ALL RESPONSES TO PHQ9 QUESTIONS 1 & 2: 0
SUM OF ALL RESPONSES TO PHQ QUESTIONS 1-9: 0
2. FEELING DOWN, DEPRESSED OR HOPELESS: 0
1. LITTLE INTEREST OR PLEASURE IN DOING THINGS: 0

## 2024-01-31 NOTE — PROGRESS NOTES
[facility]  [facility]  [address] [place] [address]  Dept: [phone]  Dept Fax: [phone]    Office Progress/Follow Up Note  Date ofpatient's visit: 1/31/2024  Patient's Name:  Yo Clark YOB: 1965            Patient Care Team:  Dragan Cooney MD as PCP - General (Internal Medicine)  ================================================================    REASON FOR VISIT/CHIEF COMPLAINT:  FMLA (Pt here to get forms filled out for re-certification on a pre-existing condition. Scanned in media) and Back Pain (Sciatica pain flaring up)    HISTORY OF PRESENTING ILLNESS:  History was obtained from: patient, electronic medical record. Yo Espinoza a 58 y.o. is here for a follow-up visit.    Patient is here for FMLA approval.  Patient has history of sciatica, multilevel degenerative disease with bilateral foraminal narrowing.  L3-L4 severe stenosis.  Evaluated by neurosurgery in the past, recommended multimodal conservative management.  Complaining of episodic back pain, severe in intensity, radiating to left lower extremity, worse during winter.  Patient is doing physical therapy at home.  Physical therapy and Motrin provid moderate relief.  Patient has a history of hypertension.  Reported that he checks his blood pressure at home.  Reported readings are 120-130/70-80.  He is compliant with his medications.  He is taking amlodipine 5 mg, lisinopril hydrochlorothiazide 20-25 Mg daily.  Blood pressure in the office today is normal.  Patient has history of cluster headache.  No episodes for the past 1 year.  Recent colonoscopy 2/25/2022  shows 6 mm pedunculated polyp.  Pathology shows tubular adenoma.  Follow-up colonoscopy 5-7 years advised  Patient has smoking history, 1/2 PPD for the last 40 years.  Denies low-dose chest CT for CT lung screening.  Smoking cessation counseling done.  Denies vaccination for COVID.Denies Hep C/HIV screen.   We have talked

## 2024-01-31 NOTE — PROGRESS NOTES
Attending Physician Statement  I have discussed the care of Yo Clark including pertinent history and exam findings,  with the resident. I have reviewed the key elements of all parts of the encounter with the resident.  I agree with the assessment, plan and orders as documented by the resident.  (GE Modifier)    MD JOHN Aguillon  Attending Physician, [facility]   Faculty, Internal Medicine Residency Program   Physician [facility]  1/31/2024, 1:36 PM

## 2024-02-09 ENCOUNTER — TELEPHONE (OUTPATIENT)
Dept: INTERNAL MEDICINE | Age: 59
End: 2024-02-09

## 2024-02-09 NOTE — TELEPHONE ENCOUNTER
[employer] FMLA form came back to the office for more information, form need to be completed before 2/18/2024, if not received by the date [employer] will make decision based on the information available. Please advise. Writer put form in pcp mail box

## 2024-05-27 DIAGNOSIS — I10 PRIMARY HYPERTENSION: ICD-10-CM

## 2024-05-28 RX ORDER — AMLODIPINE BESYLATE 5 MG/1
5 TABLET ORAL DAILY
Qty: 90 TABLET | Refills: 1 | Status: SHIPPED | OUTPATIENT
Start: 2024-05-28

## 2024-05-28 NOTE — TELEPHONE ENCOUNTER
Yo Clark is calling to request a refill on the following medication(s):    Medication Request:  Requested Prescriptions     Pending Prescriptions Disp Refills    amLODIPine (NORVASC) 5 MG tablet [Pharmacy Med Name: AMLODIPINE BESYLATE 5 MG TAB] 90 tablet 1     Sig: take 1 tablet by mouth once daily       Last Visit Date (If Applicable):  1/31/2024    Next Visit Date:    Visit date not found

## 2024-08-07 ENCOUNTER — OFFICE VISIT (OUTPATIENT)
Dept: INTERNAL MEDICINE | Age: 59
End: 2024-08-07
Payer: COMMERCIAL

## 2024-08-07 VITALS
DIASTOLIC BLOOD PRESSURE: 90 MMHG | TEMPERATURE: 97.7 F | HEART RATE: 84 BPM | OXYGEN SATURATION: 98 % | BODY MASS INDEX: 27.85 KG/M2 | HEIGHT: 69 IN | WEIGHT: 188 LBS | SYSTOLIC BLOOD PRESSURE: 144 MMHG

## 2024-08-07 DIAGNOSIS — G44.019 EPISODIC CLUSTER HEADACHE, NOT INTRACTABLE: ICD-10-CM

## 2024-08-07 DIAGNOSIS — R73.03 PREDIABETES: ICD-10-CM

## 2024-08-07 DIAGNOSIS — F17.200 TOBACCO USE DISORDER: ICD-10-CM

## 2024-08-07 DIAGNOSIS — M48.062 SPINAL STENOSIS OF LUMBAR REGION WITH NEUROGENIC CLAUDICATION: ICD-10-CM

## 2024-08-07 DIAGNOSIS — I10 PRIMARY HYPERTENSION: Primary | ICD-10-CM

## 2024-08-07 LAB — HBA1C MFR BLD: 6 %

## 2024-08-07 PROCEDURE — 99214 OFFICE O/P EST MOD 30 MIN: CPT

## 2024-08-07 PROCEDURE — 3080F DIAST BP >= 90 MM HG: CPT

## 2024-08-07 PROCEDURE — 83036 HEMOGLOBIN GLYCOSYLATED A1C: CPT

## 2024-08-07 PROCEDURE — 99203 OFFICE O/P NEW LOW 30 MIN: CPT

## 2024-08-07 PROCEDURE — 3077F SYST BP >= 140 MM HG: CPT

## 2024-08-07 RX ORDER — IBUPROFEN 600 MG/1
600 TABLET ORAL EVERY 8 HOURS PRN
Qty: 30 TABLET | Refills: 0 | Status: SHIPPED | OUTPATIENT
Start: 2024-08-07

## 2024-08-07 RX ORDER — LISINOPRIL AND HYDROCHLOROTHIAZIDE 25; 20 MG/1; MG/1
1 TABLET ORAL DAILY
Qty: 30 TABLET | Refills: 0 | Status: SHIPPED | OUTPATIENT
Start: 2024-08-07

## 2024-08-07 RX ORDER — CYCLOBENZAPRINE HCL 5 MG
5 TABLET ORAL 3 TIMES DAILY PRN
Qty: 30 TABLET | Refills: 0 | Status: SHIPPED | OUTPATIENT
Start: 2024-08-07 | End: 2024-08-17

## 2024-08-07 RX ORDER — AMLODIPINE BESYLATE 5 MG/1
5 TABLET ORAL DAILY
Qty: 30 TABLET | Refills: 0 | Status: SHIPPED | OUTPATIENT
Start: 2024-08-07

## 2024-08-07 ASSESSMENT — ENCOUNTER SYMPTOMS
ABDOMINAL PAIN: 0
NAUSEA: 0
COUGH: 0
RESPIRATORY NEGATIVE: 1
CHEST TIGHTNESS: 0
WHEEZING: 0
CONSTIPATION: 0
RHINORRHEA: 0
SINUS PAIN: 0
ABDOMINAL DISTENTION: 0
SINUS PRESSURE: 0
BLOOD IN STOOL: 0
GASTROINTESTINAL NEGATIVE: 1
BACK PAIN: 1
SHORTNESS OF BREATH: 0
VOMITING: 0
DIARRHEA: 0
EYES NEGATIVE: 1

## 2024-08-07 ASSESSMENT — PATIENT HEALTH QUESTIONNAIRE - PHQ9
2. FEELING DOWN, DEPRESSED OR HOPELESS: NOT AT ALL
SUM OF ALL RESPONSES TO PHQ QUESTIONS 1-9: 2
SUM OF ALL RESPONSES TO PHQ QUESTIONS 1-9: 2
1. LITTLE INTEREST OR PLEASURE IN DOING THINGS: MORE THAN HALF THE DAYS
SUM OF ALL RESPONSES TO PHQ QUESTIONS 1-9: 2
SUM OF ALL RESPONSES TO PHQ QUESTIONS 1-9: 2
SUM OF ALL RESPONSES TO PHQ9 QUESTIONS 1 & 2: 2

## 2024-08-07 NOTE — ASSESSMENT & PLAN NOTE
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  States it is usually better controlled.  Needs refills for amlodipine, lisinopril-HCTZ    -Continue amlodipine 5 mg daily, lisinopril-HCTZ 20-25 mg daily

## 2024-08-07 NOTE — PROGRESS NOTES
Attending Physician Statement  I have discussed the care of Yo Clark, including pertinent history and exam findings with the resident. I have reviewed the key elements of all parts of the encounter with the resident. I have seen and examined the patient with the resident and the key elements of all parts of the encounter have been performed by me.  Added history includes HTN, lumbago, smoker here to est care with new resident physician. I agree with the assessment, and status of the problem list as documented.    Diagnosis Orders   1. Primary hypertension  lisinopril-hydroCHLOROthiazide (PRINZIDE;ZESTORETIC) 20-25 MG per tablet    amLODIPine (NORVASC) 5 MG tablet      2. Spinal stenosis of lumbar region with neurogenic claudication  ibuprofen (ADVIL;MOTRIN) 600 MG tablet    diclofenac sodium (VOLTAREN) 1 % GEL    cyclobenzaprine (FLEXERIL) 5 MG tablet      3. Episodic cluster headache, not intractable        4. Tobacco use disorder        5. Prediabetes  POCT glycosylated hemoglobin (Hb A1C)        The plan and orders should include   Orders Placed This Encounter   Procedures    POCT glycosylated hemoglobin (Hb A1C)    and this was also documented by the resident. Would encourage patient to return in 1 month. Continues to refuse blood work but understands we can't continue to prescribe without labs.The medication list was reviewed with the resident and is up to date. The return visit should be in 1 month .    Dr Zulema Zabala MD, FACP  Associate , Internal Medicine Residency Program  [facility]  Chair, Department of Internal Medicine  [facility] Internal Medicine Clerkship         8/7/2024, 11:18 AM

## 2024-08-07 NOTE — PROGRESS NOTES
[facility]  [facility]  [address] [place] [address]  Dept: [phone]  Dept Fax: [phone]    Office Progress/Follow Up Note  Date of patient's visit: 8/7/2024  Patient's Name:  Yo Clark YOB: 1965            Patient Care Team:  Yo Pavon MD as PCP - General (Internal Medicine)    REASON FOR VISIT: Routine outpatient follow up    HISTORY OF PRESENT ILLNESS:      Chief Complaint   Patient presents with    New To Provider    Back Pain    Forms     Patient has FMLA paperwork that needs completed       History was obtained from the patient. Yo Clark is a 59 y.o. is here for a follow up visit. Patient reported continued back pain.     Patient has long history of lower back pain, MRI lumbar spine in 2021 showed canal stenosis of L3-4, L4-5 with severe narrowing of lateral recesses of L3-4.  Patient was evaluated by neurosurgery in 2021 and as patient had not exhausted conservative measures, recommended physical therapy and medical management at that time.    Patient says back pain is currently at baseline.  States it is exacerbated by extended periods of sitting and heavy lifting.  Patient works as a  and loads and unloads trucks.  He takes ibuprofen and uses Voltaren gel several times per month as needed when pain is severe.  Otherwise uses Tylenol for mild pain.  Formerly took Zanaflex, however prescription ran out and he states it did not have much effect on his pain.  States he formerly followed with physical therapy, has not seen in several years, however he does the recommended exercises at home.    Patient's blood pressure elevated in clinic today, 144/90.  Patient states he is usually better controlled at home, he works third shift and recently had coffee, has not slept after work yet today.    Advised patient as he is using NSAIDs and is on lisinopril-HCTZ for hypertension, at least BMP should be checked for any

## 2024-08-07 NOTE — ASSESSMENT & PLAN NOTE
Patient has approximately 20 pack-year smoking history.  Smokes approximately 1/2 PPD currently.  On chart review, has never had low-dose CT screening.  Declines low-dose CT screening today.    - Counseled cessation

## 2024-08-07 NOTE — ASSESSMENT & PLAN NOTE
Back pain is at baseline.  Worsened with extended sitting, leaning forward, and heavy lifting.  LA paperwork renewed.  Patient taking Tylenol for mild pain, infrequently uses ibuprofen, Voltaren gel for severe pain.  Previously used Zanaflex with little effect.  Advised patient should get routine lab work as he is on NSAIDs, ACE inhibitor, and thiazide diuretic which could lead to renal issues.  Patient declined all routine lab work today, last labs in 2022 are grossly WNL    - Continue Tylenol for mild pain  - Advised judicious use of NSAIDs including ibuprofen and Voltaren gel for severe pain  - Trial of Flexeril as patient has not used in past

## 2024-08-07 NOTE — ASSESSMENT & PLAN NOTE
A1c 6.0% today in clinic.  Advised patient this may progress to diabetes if left unchecked.  Patient declines starting metformin today.  Advised patient about dietary changes and exercise.    - Counseled diabetic diet and lifestyle changes

## 2024-10-04 ENCOUNTER — HOSPITAL ENCOUNTER (OUTPATIENT)
Age: 59
Setting detail: SPECIMEN
Discharge: HOME | End: 2024-10-04

## 2024-10-04 DIAGNOSIS — I10 PRIMARY HYPERTENSION: ICD-10-CM

## 2024-10-05 LAB
ANION GAP SERPL CALCULATED.3IONS-SCNC: 10 MMOL/L (ref 9–16)
BUN SERPL-MCNC: 11 MG/DL (ref 8–23)
CALCIUM SERPL-MCNC: 8.7 MG/DL (ref 8.6–10.4)
CHLORIDE SERPL-SCNC: 107 MMOL/L (ref 98–107)
CO2 SERPL-SCNC: 24 MMOL/L (ref 20–31)
CREAT SERPL-MCNC: 1 MG/DL (ref 0.7–1.2)
GFR, ESTIMATED: 83 ML/MIN/1.73M2
GLUCOSE SERPL-MCNC: 99 MG/DL (ref 74–99)
POTASSIUM SERPL-SCNC: 4.5 MMOL/L (ref 3.7–5.3)
SODIUM SERPL-SCNC: 141 MMOL/L (ref 136–145)

## 2024-10-07 DIAGNOSIS — I10 PRIMARY HYPERTENSION: ICD-10-CM

## 2024-10-07 DIAGNOSIS — M48.062 SPINAL STENOSIS OF LUMBAR REGION WITH NEUROGENIC CLAUDICATION: ICD-10-CM

## 2024-10-07 NOTE — TELEPHONE ENCOUNTER
Yo Clark is calling to request a refill on the following medication(s):    Medication Request:  Requested Prescriptions     Pending Prescriptions Disp Refills    amLODIPine (NORVASC) 5 MG tablet [Pharmacy Med Name: amLODIPine BESYLATE 5 MG TAB] 30 tablet 0     Sig: TAKE 1 TABLET BY MOUTH DAILY    cyclobenzaprine (FLEXERIL) 5 MG tablet [Pharmacy Med Name: CYCLOBENZAPRINE 5 MG TABLET] 30 tablet 0     Sig: TAKE ONE TABLET BY MOUTH THREE TIMES A DAY AS NEEDED FOR MUSCLE SPASMS    lisinopril-hydroCHLOROthiazide (PRINZIDE;ZESTORETIC) 20-25 MG per tablet [Pharmacy Med Name: LISINOPRIL-HCTZ 20-25 MG TAB] 30 tablet 0     Sig: TAKE 1 TABLET BY MOUTH DAILY       Last Visit Date (If Applicable):  8/7/2024    Next Visit Date:    10/9/2024

## 2024-10-13 RX ORDER — CYCLOBENZAPRINE HCL 5 MG
5 TABLET ORAL 3 TIMES DAILY PRN
Qty: 30 TABLET | Refills: 2 | Status: SHIPPED | OUTPATIENT
Start: 2024-10-13

## 2024-10-13 RX ORDER — LISINOPRIL AND HYDROCHLOROTHIAZIDE 20; 25 MG/1; MG/1
1 TABLET ORAL DAILY
Qty: 30 TABLET | Refills: 2 | Status: SHIPPED | OUTPATIENT
Start: 2024-10-13

## 2024-10-13 RX ORDER — AMLODIPINE BESYLATE 5 MG/1
5 TABLET ORAL DAILY
Qty: 30 TABLET | Refills: 5 | Status: SHIPPED | OUTPATIENT
Start: 2024-10-13

## 2024-10-15 ENCOUNTER — TELEPHONE (OUTPATIENT)
Dept: INTERNAL MEDICINE | Age: 59
End: 2024-10-15

## 2024-10-16 ENCOUNTER — OFFICE VISIT (OUTPATIENT)
Dept: INTERNAL MEDICINE | Age: 59
End: 2024-10-16

## 2024-10-16 VITALS
HEIGHT: 69 IN | SYSTOLIC BLOOD PRESSURE: 134 MMHG | BODY MASS INDEX: 28.2 KG/M2 | HEART RATE: 75 BPM | TEMPERATURE: 97.5 F | DIASTOLIC BLOOD PRESSURE: 85 MMHG | OXYGEN SATURATION: 97 % | WEIGHT: 190.4 LBS

## 2024-10-16 DIAGNOSIS — R73.03 PREDIABETES: ICD-10-CM

## 2024-10-16 DIAGNOSIS — L57.0 ACTINIC KERATOSIS: ICD-10-CM

## 2024-10-16 DIAGNOSIS — I10 PRIMARY HYPERTENSION: ICD-10-CM

## 2024-10-16 DIAGNOSIS — F17.200 TOBACCO USE DISORDER: ICD-10-CM

## 2024-10-16 DIAGNOSIS — M48.062 SPINAL STENOSIS OF LUMBAR REGION WITH NEUROGENIC CLAUDICATION: Primary | ICD-10-CM

## 2024-10-16 ASSESSMENT — ENCOUNTER SYMPTOMS
BACK PAIN: 1
DIARRHEA: 0
SORE THROAT: 0
NAUSEA: 0
VOMITING: 0
ABDOMINAL PAIN: 0
SHORTNESS OF BREATH: 0
COUGH: 0
WHEEZING: 0
CHEST TIGHTNESS: 0
CONSTIPATION: 0
RHINORRHEA: 0

## 2024-10-16 NOTE — ASSESSMENT & PLAN NOTE
Patient has new left scalp lesion which she first noticed 1 month ago.  Lesion is 2-3 mm, dark, with raised, flaky border.  Appears likely actinic keratosis.  Patient denies any recent or prolonged sun exposure to scalp.    - Continue to monitor  - Consider dermatology referral if lesion worsens or fails to resolve

## 2024-10-16 NOTE — PROGRESS NOTES
Attending Physician Statement  I have discussed the care of Yo Clark, including pertinent history and exam findings,  with the resident. I have reviewed the key elements of all parts of the encounter with the resident.  I agree with the assessment, plan and orders as documented by the resident.  (GE Modifier)   
hypertension  Assessment & Plan:  Mildly hypertensive in clinic today, 134/85.  States BP at home is usually 130-140s.    Advised patient can increase amlodipine to 10 mg daily for improved control, patient declined at this time. No refills needed.      -Continue amlodipine 5 mg daily, lisinopril-HCTZ 20-25 mg daily     3. Prediabetes  Assessment & Plan:  A1c 6.0% at last clinic visit August 2024.  Advised patient this may progress to diabetes if left uncontrolled.  Patient verbalized understanding.     - Counseled diabetic diet, lifestyle changes, smoking cessation  4. Actinic keratosis  Assessment & Plan:  Patient has new left scalp lesion which she first noticed 1 month ago.  Lesion is 2-3 mm, dark, with raised, flaky border.  Appears likely actinic keratosis.  Patient denies any recent or prolonged sun exposure to scalp.    - Continue to monitor  - Consider dermatology referral if lesion worsens or fails to resolve  5. Tobacco use disorder  Assessment & Plan:  Patient has approximately 20 pack-year smoking history.  Continues to smoke approximately 1/2 PPD currently.  On chart review, has never had low-dose CT screening.  Declines low-dose CT screening today.     - Counseled cessation         FOLLOW UP AND INSTRUCTIONS:   RTC in 6 months    Yo received counseling on the following healthy behaviors: nutrition, exercise, and tobacco cessation    Discussed use, benefit, and side effects of prescribed medications.  Barriers to medication compliance addressed.  All patient questions answered.  Pt voiced understanding.     Patient given educational materials - see patient instructions        Yo Pavon MD  Internal Medicine Resident, PGY-1  [facility]; [place], [place]  10/16/2024, 12:08 PM

## 2024-10-16 NOTE — ASSESSMENT & PLAN NOTE
Patient has approximately 20 pack-year smoking history.  Continues to smoke approximately 1/2 PPD currently.  On chart review, has never had low-dose CT screening.  Declines low-dose CT screening today.     - Counseled cessation

## 2024-10-16 NOTE — ASSESSMENT & PLAN NOTE
A1c 6.0% at last clinic visit August 2024.  Advised patient this may progress to diabetes if left uncontrolled.  Patient verbalized understanding.     - Counseled diabetic diet, lifestyle changes, smoking cessation

## 2024-10-16 NOTE — ASSESSMENT & PLAN NOTE
Mildly hypertensive in clinic today, 134/85.  States BP at home is usually 130-140s.    Advised patient can increase amlodipine to 10 mg daily for improved control, patient declined at this time. No refills needed.      -Continue amlodipine 5 mg daily, lisinopril-HCTZ 20-25 mg daily

## 2024-11-01 NOTE — ASSESSMENT & PLAN NOTE
Back pain remains at baseline.  Worsened with extended sitting, leaning forward, and heavy lifting.   Patient taking Tylenol for mild pain, infrequently uses ibuprofen, Voltaren gel for severe pain.  Started on Flexeril at last visit with little effect.  BMP on 10/5/2024 WNL, creatinine 1.0    - Continue Tylenol for mild pain  - Advised judicious use of NSAIDs including ibuprofen and Voltaren gel for severe pain  - Continue Flexeril 5 mg TID PRN      No.

## 2025-01-15 ENCOUNTER — HOSPITAL ENCOUNTER (EMERGENCY)
Age: 60
Discharge: HOME | End: 2025-01-15
Attending: EMERGENCY MEDICINE
Payer: COMMERCIAL

## 2025-01-15 ENCOUNTER — APPOINTMENT (OUTPATIENT)
Dept: GENERAL RADIOLOGY | Age: 60
End: 2025-01-15
Payer: COMMERCIAL

## 2025-01-15 VITALS
WEIGHT: 185 LBS | RESPIRATION RATE: 18 BRPM | HEART RATE: 79 BPM | DIASTOLIC BLOOD PRESSURE: 95 MMHG | OXYGEN SATURATION: 95 % | TEMPERATURE: 98.2 F | SYSTOLIC BLOOD PRESSURE: 162 MMHG | BODY MASS INDEX: 27.31 KG/M2

## 2025-01-15 DIAGNOSIS — M54.31 SCIATICA OF RIGHT SIDE: ICD-10-CM

## 2025-01-15 DIAGNOSIS — M25.551 RIGHT HIP PAIN: Primary | ICD-10-CM

## 2025-01-15 PROCEDURE — 6370000000 HC RX 637 (ALT 250 FOR IP)

## 2025-01-15 PROCEDURE — 73502 X-RAY EXAM HIP UNI 2-3 VIEWS: CPT

## 2025-01-15 PROCEDURE — 99283 EMERGENCY DEPT VISIT LOW MDM: CPT | Performed by: EMERGENCY MEDICINE

## 2025-01-15 PROCEDURE — 72100 X-RAY EXAM L-S SPINE 2/3 VWS: CPT

## 2025-01-15 RX ORDER — GABAPENTIN 100 MG/1
100 CAPSULE ORAL 2 TIMES DAILY
Qty: 60 CAPSULE | Refills: 0 | Status: SHIPPED | OUTPATIENT
Start: 2025-01-15 | End: 2025-02-14

## 2025-01-15 RX ORDER — METHYLPREDNISOLONE 4 MG/1
TABLET ORAL
Qty: 21 TABLET | Refills: 0 | Status: SHIPPED | OUTPATIENT
Start: 2025-01-15 | End: 2025-01-21

## 2025-01-15 RX ORDER — GABAPENTIN 300 MG/1
300 CAPSULE ORAL ONCE
Status: COMPLETED | OUTPATIENT
Start: 2025-01-15 | End: 2025-01-15

## 2025-01-15 RX ADMIN — GABAPENTIN 300 MG: 300 CAPSULE ORAL at 15:02

## 2025-01-15 ASSESSMENT — PAIN SCALES - GENERAL: PAINLEVEL_OUTOF10: 10

## 2025-01-15 ASSESSMENT — PAIN - FUNCTIONAL ASSESSMENT: PAIN_FUNCTIONAL_ASSESSMENT: 0-10

## 2025-01-15 ASSESSMENT — PAIN DESCRIPTION - LOCATION: LOCATION: HIP

## 2025-01-15 NOTE — ED NOTES
Pt is A+Ox4  Pt complains of right hip pain x 1 month  Pt denies any falls or recent trauma to the right hip  Pt states he woke up with right hip pain x 1 month ago  Pt denies numbness or tingling to the lower extremities  Pt ambulates with a steady gait from triage to the [facility]  Pt denies incontinence of urine  Pt denies incontinence of stool  All questions answered and needs met at this time

## 2025-01-15 NOTE — DISCHARGE INSTRUCTIONS
Call today or tomorrow to follow up with Yo Pavon MD  in 3 days.    You were seen in the emergency department for right hip pain.  X-rays of your right hip and lumbar spine were performed.  Imaging was negative for any acute fractures.  You appear to have lumbar spinal stenosis which is likely causing nerve pain.  This is likely the cause of your symptoms.  Follow-up with your primary care and they will give you a referral to physical therapy or a spinal surgeon.  You were prescribed gabapentin which can help with nerve pain.  Please take this as directed.    Use an ice pack or bag filled with ice and apply to the injured area 3 - 4 times a day for 15 - 20 minutes each time.  If the injury is older than 3 days, then use a heating pad to help relax the muscles in your back.    Use ibuprofen or Tylenol (unless prescribed medications that have Tylenol in it) for pain.  You can take over the counter Ibuprofen (advil) tablets (4 tablets every 8 hours or 3 tablets every 6 hours or 2 tablets every 4 hours)    Return to the Emergency Department for inability to move legs, worsening of pain, tingling / loss of sensation, any other care or concern.

## 2025-01-16 ASSESSMENT — ENCOUNTER SYMPTOMS
BACK PAIN: 1
RESPIRATORY NEGATIVE: 1
GASTROINTESTINAL NEGATIVE: 1

## 2025-01-21 ENCOUNTER — TELEPHONE (OUTPATIENT)
Dept: INTERNAL MEDICINE | Age: 60
End: 2025-01-21

## 2025-01-21 NOTE — TELEPHONE ENCOUNTER
----- Message from Kathya MURRY sent at 1/21/2025 10:53 AM EST -----  Regarding: E[facility] Appointment Request  ECC Appointment Request    Patient needs appointment for ECC Appointment Type: ED Follow-Up.    Patient Requested Dates(s): Any day sooner than Feb 12, 2025   Patient Requested Time: After 3pm   Provider Name: Yo Pavon MD     Reason for Appointment Request: Established Patient - Available appointments did not meet patient need // pt wanted to have an appt. For his ED follow up, If pt's provider is not available, pt is okay to have it with another provider in the practice.   --------------------------------------------------------------------------------------------------------------------------    Relationship to Patient: Self     Call Back Information: OK to leave message on voicemail  Preferred Call Back Number: Phone [phone]

## 2025-01-21 NOTE — TELEPHONE ENCOUNTER
PC to pt to offer 8:30 appt on 1/29 with PCP, double booked time slot per dept policy, no answer. Pt identifies self by full name on VM, left message that this appt time was secured for him, left writer's direct number if date/time do not work for pt.    Pt called back, confirmed he is good with date/time

## 2025-01-22 DIAGNOSIS — I10 PRIMARY HYPERTENSION: ICD-10-CM

## 2025-01-23 ENCOUNTER — TELEPHONE (OUTPATIENT)
Dept: INTERNAL MEDICINE | Age: 60
End: 2025-01-23

## 2025-01-23 NOTE — TELEPHONE ENCOUNTER
Last visit: 10/16/24  Last Med refill: 10/13/24  Does patient have enough medication for 72 hours: No:     Next Visit Date:1/29/25  Future Appointments   Date Time Provider Department Center   1/29/2025  8:30 AM Yo Pavon MD [facility] DEP       Health Maintenance   Topic Date Due    Pneumococcal 0-64 years Vaccine (1 of 2 - PCV) Never done    DTaP/Tdap/Td vaccine (1 - Tdap) Never done    Lipids  Never done    Flu vaccine (1) Never done    COVID-19 Vaccine (1 - 2023-24 season) Never done    Hepatitis C screen  01/31/2025 (Originally 5/7/1981)    HIV screen  01/31/2025 (Originally 5/7/1978)    Shingles vaccine (1 of 2) 08/06/2025 (Originally 5/7/2013)    A1C test (Diabetic or Prediabetic)  08/07/2025    Depression Screen  08/07/2025    Colorectal Cancer Screen  02/25/2032    Respiratory Syncytial Virus (RSV) Pregnant or age 60 yrs+ (1 - 1-dose 75+ series) 05/07/2038    Hepatitis A vaccine  Aged Out    Hepatitis B vaccine  Aged Out    Hib vaccine  Aged Out    Polio vaccine  Aged Out    Meningococcal (ACWY) vaccine  Aged Out    Diabetes screen  Discontinued    Prostate Specific Antigen (PSA) Screening or Monitoring  Discontinued       Hemoglobin A1C (%)   Date Value   08/07/2024 6.0             ( goal A1C is < 7)   No components found for: \"LABMICR\"  No components found for: \"LDLCHOLESTEROL\", \"LDLCALC\"    (goal LDL is <100)   BUN (mg/dL)   Date Value   10/05/2024 11     BP Readings from Last 3 Encounters:   01/15/25 (!) 162/95   10/16/24 134/85   08/07/24 (!) 144/90          (goal 120/80)    All Future Testing planned in [facility]  Lab Frequency Next Occurrence               Patient Active Problem List:     Neuropathy     Hypertension     Tobacco use disorder     Sciatica associated with disorder of lumbar spine     Episodic cluster headache, not intractable     Spinal stenosis of lumbar region with neurogenic claudication     Prediabetes     Actinic keratosis

## 2025-01-24 RX ORDER — LISINOPRIL AND HYDROCHLOROTHIAZIDE 20; 25 MG/1; MG/1
1 TABLET ORAL DAILY
Qty: 30 TABLET | Refills: 5 | Status: SHIPPED | OUTPATIENT
Start: 2025-01-24

## 2025-01-29 ENCOUNTER — OFFICE VISIT (OUTPATIENT)
Dept: INTERNAL MEDICINE | Age: 60
End: 2025-01-29

## 2025-01-29 VITALS
HEIGHT: 69 IN | SYSTOLIC BLOOD PRESSURE: 154 MMHG | BODY MASS INDEX: 27.28 KG/M2 | HEART RATE: 84 BPM | OXYGEN SATURATION: 97 % | DIASTOLIC BLOOD PRESSURE: 96 MMHG | WEIGHT: 184.2 LBS

## 2025-01-29 DIAGNOSIS — M54.31 SCIATIC NERVE PAIN, RIGHT: Primary | ICD-10-CM

## 2025-01-29 RX ORDER — IBUPROFEN 800 MG/1
800 TABLET, FILM COATED ORAL 2 TIMES DAILY PRN
Qty: 60 TABLET | Refills: 0 | Status: SHIPPED | OUTPATIENT
Start: 2025-01-29

## 2025-01-29 RX ORDER — ACETAMINOPHEN 500 MG
1000 TABLET ORAL 2 TIMES DAILY PRN
Qty: 120 TABLET | Refills: 0 | Status: SHIPPED | OUTPATIENT
Start: 2025-01-29

## 2025-01-29 RX ORDER — LIDOCAINE 50 MG/G
1 PATCH TOPICAL DAILY
Qty: 30 PATCH | Refills: 0 | Status: SHIPPED | OUTPATIENT
Start: 2025-01-29 | End: 2025-02-28

## 2025-01-29 RX ORDER — GABAPENTIN 300 MG/1
300 CAPSULE ORAL 2 TIMES DAILY
Qty: 60 CAPSULE | Refills: 0 | Status: SHIPPED | OUTPATIENT
Start: 2025-01-29 | End: 2025-02-28

## 2025-01-29 ASSESSMENT — PATIENT HEALTH QUESTIONNAIRE - PHQ9
1. LITTLE INTEREST OR PLEASURE IN DOING THINGS: NOT AT ALL
2. FEELING DOWN, DEPRESSED OR HOPELESS: NOT AT ALL
SUM OF ALL RESPONSES TO PHQ QUESTIONS 1-9: 0
SUM OF ALL RESPONSES TO PHQ QUESTIONS 1-9: 0
SUM OF ALL RESPONSES TO PHQ9 QUESTIONS 1 & 2: 0
SUM OF ALL RESPONSES TO PHQ QUESTIONS 1-9: 0
SUM OF ALL RESPONSES TO PHQ QUESTIONS 1-9: 0

## 2025-01-29 ASSESSMENT — ENCOUNTER SYMPTOMS
NAUSEA: 0
CHEST TIGHTNESS: 0
COUGH: 0
BACK PAIN: 1
RHINORRHEA: 0
SORE THROAT: 0
SINUS PRESSURE: 0
DIARRHEA: 0
WHEEZING: 0
CONSTIPATION: 0
ABDOMINAL DISTENTION: 0
SHORTNESS OF BREATH: 0
ABDOMINAL PAIN: 0
VOMITING: 0

## 2025-01-29 NOTE — PROGRESS NOTES
[facility]  [facility]  [address] [place] [address]  Dept: [phone]  Dept Fax: [phone]    Office Progress/Follow Up Note  Date of patient's visit: 1/29/2025  Patient's Name:  Yo Clark YOB: 1965            Patient Care Team:  Yo Pavon MD as PCP - General (Internal Medicine)  Zulema Zabala MD as PCP - Empaneled Provider    REASON FOR VISIT: Post ED visit    HISTORY OF PRESENT ILLNESS:      Chief Complaint   Patient presents with    Follow-up     Hip pain  Sciatica pain  Was given steroids and gabapentin         History was obtained from the patient. Yo Clark is a 59 y.o. is here for a follow up visit. Patient reported severe right lower extremity sciatic nerve pain for which he presented to ED in January 2025.  He was discharged with short course of steroids and gabapentin.  He states that pain has remained severe since ED visit.  He has used his prior FMLA allowance and is now using his vacation days as he has been unable to work due to the severity of the pain.  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  Rates pain 9-10/10 intensity, originates in right lower back and has sharp shooting sensation wrapping around the right lower extremity down to the right calf.  Has had no relief with Tylenol, Motrin or steroids and gabapentin 100 mg BID that ED prescribed.  No other acute complaints today.  Denies any recent fever, chills, chest pain, shortness of breath, abdominal pain, nausea, vomiting, diarrhea, constipation, urinary symptoms.    Hypertensive to 154/96 in clinic today, likely secondary to pain.  Patient's blood pressure had been well-controlled at prior office visit.  Will defer increasing antihypertensive regimen at this time.    Pertinent screening:  Colon cancer:

## 2025-01-29 NOTE — ASSESSMENT & PLAN NOTE
Patient has chronic right lower extremity sciatic nerve pain.  Has been in acute exacerbation since early January 2025.  Had ED visit and was prescribed steroids and gabapentin 100 mg BID which was not effective in resolving his pain.  Patient has been to physical therapy before and endorses still doing the recommended exercises daily.  Reviewed exercises with patient and advised him to continue the regimen.  Has had no relief with Tylenol, ibuprofen at home.  Patient had prior MRI lumbar spine in 2021 which showed degenerative disc disease and multilevel degenerative facet hypertrophy with a superimposed midline disc extrusion at L3-4, canal stenosis at L3-4 and L4-5, and severe narrowing of the L3-4 lateral recesses.  Will order repeat MRI lumbar spine and neurosurgery referral, this was discussed with patient he is amenable.  Will trial multimodal pain control until patient can be seen by neurosurgery.  Will start gabapentin 300 mg BID with ibuprofen 800 mg BID with breakfast and dinner, Tylenol 1000 mg at midday, and Flexeril 10 mg at bedtime along with lidocaine patch.  Discussed this regimen with patient and he verbalized understanding.

## 2025-02-03 ENCOUNTER — TELEPHONE (OUTPATIENT)
Dept: INTERNAL MEDICINE | Age: 60
End: 2025-02-03

## 2025-02-03 NOTE — TELEPHONE ENCOUNTER
Please have provider clarify their best estimate of the total amount of leave needed bases on their medical knowledge, examination and medical hx of the pt.      Pt [employer] FMLA form need completed the estimate of time off pt will need, need filed in.    Scanned clarification fmla form to pt chart & put back in your mail box

## 2025-02-05 ENCOUNTER — HOSPITAL ENCOUNTER (OUTPATIENT)
Dept: MRI IMAGING | Age: 60
Discharge: HOME | End: 2025-02-07
Payer: COMMERCIAL

## 2025-02-05 DIAGNOSIS — M54.31 SCIATIC NERVE PAIN, RIGHT: ICD-10-CM

## 2025-02-05 PROCEDURE — 72148 MRI LUMBAR SPINE W/O DYE: CPT

## 2025-02-05 NOTE — TELEPHONE ENCOUNTER
Writer faxed estimate date of 1/29/25 to 2/28/25 back to [employer], plus let [employer] know about pt appt pt have with neuro on 2/10/25.

## 2025-02-10 ENCOUNTER — HOSPITAL ENCOUNTER (OUTPATIENT)
Dept: GENERAL RADIOLOGY | Age: 60
Discharge: HOME | End: 2025-02-12
Payer: COMMERCIAL

## 2025-02-10 ENCOUNTER — HOSPITAL ENCOUNTER (OUTPATIENT)
Age: 60
Discharge: HOME | End: 2025-02-12
Payer: COMMERCIAL

## 2025-02-10 ENCOUNTER — OFFICE VISIT (OUTPATIENT)
Dept: NEUROSURGERY | Age: 60
End: 2025-02-10
Payer: COMMERCIAL

## 2025-02-10 VITALS
DIASTOLIC BLOOD PRESSURE: 94 MMHG | HEIGHT: 69 IN | HEART RATE: 81 BPM | WEIGHT: 180.4 LBS | SYSTOLIC BLOOD PRESSURE: 148 MMHG | BODY MASS INDEX: 26.72 KG/M2

## 2025-02-10 DIAGNOSIS — M51.16 LUMBAR DISC HERNIATION WITH RADICULOPATHY: ICD-10-CM

## 2025-02-10 DIAGNOSIS — M54.16 SPINAL STENOSIS OF LUMBAR REGION WITH RADICULOPATHY: ICD-10-CM

## 2025-02-10 DIAGNOSIS — M54.16 SPINAL STENOSIS OF LUMBAR REGION WITH RADICULOPATHY: Primary | ICD-10-CM

## 2025-02-10 DIAGNOSIS — M48.061 SPINAL STENOSIS OF LUMBAR REGION WITH RADICULOPATHY: ICD-10-CM

## 2025-02-10 DIAGNOSIS — M48.061 SPINAL STENOSIS OF LUMBAR REGION WITH RADICULOPATHY: Primary | ICD-10-CM

## 2025-02-10 PROCEDURE — 72120 X-RAY BEND ONLY L-S SPINE: CPT

## 2025-02-10 PROCEDURE — 3077F SYST BP >= 140 MM HG: CPT | Performed by: NURSE PRACTITIONER

## 2025-02-10 PROCEDURE — 3080F DIAST BP >= 90 MM HG: CPT | Performed by: NURSE PRACTITIONER

## 2025-02-10 PROCEDURE — 99204 OFFICE O/P NEW MOD 45 MIN: CPT | Performed by: NURSE PRACTITIONER

## 2025-02-10 NOTE — PROGRESS NOTES
[facility] NEUROSURGERY [facility]  [address]  [address]  [place] [address]  Dept: [phone]    Patient:  Yo Clark  YOB: 1965  Date: 2/10/25    The patient is a 59 y.o. male who presents today for consult of the following problems:     Chief Complaint   Patient presents with    Leg Pain     Patient stated his sciatic pain is shooting down to his right leg.         HPI:     Yo Clark is a 59 y.o. male on whom neurosurgical consultation was requested by Yo Pavon MD for management of back and right leg pain. Has had intermittent issues for many years, but current exacerbation has been ongoing for the last 2.5 months. Pain is \"20\"/10 on average described as a pressure, needle sensation, burning. Pain primarily radiates along right L4 distribution. Has tried heat, ice, tylenol,  ibuprofen, lidocaine, Flexeril, and stretching exercises without benefit. Medrol dose pack was not beneficial.  Has not yet had any physical therapy for this exacerbation.  Utilizing cane for stability to help with pain.  No saddle anesthesia, loss of bowel or bladder function.    History:     Past Medical History:   Diagnosis Date    Episodic cluster headache, not intractable 10/12/2022    Hypertension      Past Surgical History:   Procedure Laterality Date    ANKLE SURGERY      COLONOSCOPY  02/25/2022    COLONOSCOPY N/A 2/25/2022    COLONOSCOPY POLYPECTOMY HOT BIOPSY performed by Yo Ruiz DO at [facility] OR     Family History   Problem Relation Age of Onset    Other Mother         seizures    High Blood Pressure Mother     High Blood Pressure Father     High Blood Pressure Sister     High Blood Pressure Brother      Current Outpatient Medications on File Prior to Visit   Medication Sig Dispense Refill    gabapentin (NEURONTIN) 300 MG capsule Take 1 capsule by mouth 2 times daily for 30 days. Intended

## 2025-02-12 ENCOUNTER — OFFICE VISIT (OUTPATIENT)
Dept: INTERNAL MEDICINE | Age: 60
End: 2025-02-12

## 2025-02-12 VITALS
OXYGEN SATURATION: 98 % | TEMPERATURE: 97 F | DIASTOLIC BLOOD PRESSURE: 80 MMHG | RESPIRATION RATE: 16 BRPM | SYSTOLIC BLOOD PRESSURE: 130 MMHG | BODY MASS INDEX: 27.25 KG/M2 | HEART RATE: 86 BPM | WEIGHT: 184 LBS | HEIGHT: 69 IN

## 2025-02-12 DIAGNOSIS — M54.31 SCIATIC NERVE PAIN, RIGHT: Primary | ICD-10-CM

## 2025-02-12 DIAGNOSIS — I10 PRIMARY HYPERTENSION: ICD-10-CM

## 2025-02-12 RX ORDER — GABAPENTIN 300 MG/1
300 CAPSULE ORAL 3 TIMES DAILY
Qty: 90 CAPSULE | Refills: 0 | Status: SHIPPED | OUTPATIENT
Start: 2025-02-12 | End: 2025-03-14

## 2025-02-12 SDOH — ECONOMIC STABILITY: FOOD INSECURITY: WITHIN THE PAST 12 MONTHS, YOU WORRIED THAT YOUR FOOD WOULD RUN OUT BEFORE YOU GOT MONEY TO BUY MORE.: NEVER TRUE

## 2025-02-12 SDOH — ECONOMIC STABILITY: FOOD INSECURITY: WITHIN THE PAST 12 MONTHS, THE FOOD YOU BOUGHT JUST DIDN'T LAST AND YOU DIDN'T HAVE MONEY TO GET MORE.: NEVER TRUE

## 2025-02-12 ASSESSMENT — ENCOUNTER SYMPTOMS
ABDOMINAL PAIN: 0
VOMITING: 0
CHEST TIGHTNESS: 0
DIARRHEA: 0
WHEEZING: 0
NAUSEA: 0
COUGH: 0
BACK PAIN: 1
SORE THROAT: 0
SHORTNESS OF BREATH: 0
RHINORRHEA: 0
SINUS PRESSURE: 0
CONSTIPATION: 0
ABDOMINAL DISTENTION: 0

## 2025-02-12 NOTE — ASSESSMENT & PLAN NOTE
/80 today, well-controlled.  Patient is compliant with his medications.    - Continue amlodipine 5 mg daily, lisinopril-HCTZ 20-25 mg daily

## 2025-02-12 NOTE — PROGRESS NOTES
Attending Physician Statement  I have discussed the care of Yo Clark, including pertinent history and exam findings with the resident. I have reviewed the key elements of all parts of the encounter with the resident. I have seen and examined the patient with the resident and the key elements of all parts of the encounter have been performed by me.   I agree with the assessment, and status of the problem list as documented. The plan and orders should include No orders of the defined types were placed in this encounter.   and this was also documented by the resident. The medication list was reviewed with the resident and is up to date. The return visit should be in 4 weeks .    Abel Marcial MD  Attending Physician,  Department of Internal Medicine  [facility]  [facility]      2/12/2025, 10:07 AM

## 2025-02-12 NOTE — PROGRESS NOTES
[facility]  [facility]  [address] [place] [address]  Dept: [phone]  Dept Fax: [phone]    Office Progress/Follow Up Note  Date of patient's visit: 2025  Patient's Name:  Yo Clark YOB: 1965            Patient Care Team:  Yo Pavno MD as PCP - General (Internal Medicine)  Zulema Zabala MD as PCP - Empaneled Provider    REASON FOR VISIT: Routine outpatient follow up    HISTORY OF PRESENT ILLNESS:      Chief Complaint   Patient presents with    Follow-up     Patient presents today for follow up. Patient states pain medication did not help and is actually worse then last visit.       History was obtained from the patient. Yo Clark is a 59 y.o. is here for a follow up visit. Patient had MRI performed for right lower extremity sciatica, showed worsening of spinal stenosis.  Patient had neurosurgery follow-up, was advised to schedule epidural steroid injection, physical therapy follow-up, with close neurosurgery follow-up.  Patient reports no improvement in some worsening of his pain since last visit.  Denies any saddle anesthesia, urinary or bowel incontinence.  Patient to schedule his epidural steroid injection and physical therapy.  Advised patient to reach out to neurosurgery for closer appointment if no improvement or worsening of pain after LEE, physical therapy.  Will plan to increase gabapentin dose today.    Pertinent screening:  Colon cancer: colonoscopy  w/ tubular adenoma removed, follow-up in 5-7 years  Lung cancer screening: Denies low-dose CT screening  Depression screening: PHQ-2 negative     Vaccinations: Denies any vaccinations today     Life-style:  Smokin pack-year history  Alcohol: Social  Diet and Exercise: Advised diabetic diet and exercise regimen      Patient Active Problem List   Diagnosis    Neuropathy    Hypertension    Tobacco use disorder    Sciatica associated with disorder of lumbar spine

## 2025-02-12 NOTE — ASSESSMENT & PLAN NOTE
1/29: Patient has chronic right lower extremity sciatic nerve pain.  Has been in acute exacerbation since early January 2025.  Had ED visit and was prescribed steroids and gabapentin 100 mg BID which was not effective in resolving his pain.  Patient has been to physical therapy before and endorses still doing the recommended exercises daily.  Reviewed exercises with patient and advised him to continue the regimen.  Has had no relief with Tylenol, ibuprofen at home.  Patient had prior MRI lumbar spine in 2021 which showed degenerative disc disease and multilevel degenerative facet hypertrophy with a superimposed midline disc extrusion at L3-4, canal stenosis at L3-4 and L4-5, and severe narrowing of the L3-4 lateral recesses.  Will order repeat MRI lumbar spine and neurosurgery referral, this was discussed with patient he is amenable.  Will trial multimodal pain control until patient can be seen by neurosurgery.     2/12: MRI showed worsening of stenosis.  Patient was seen by neurosurgery, recommended epidural steroid injection, physical therapy, close follow-up.  Patient reports worsening of the pain since last visit.  States he has been compliant with medications.  Has LEE and physical therapy follow-up scheduled.  Will plan to go up on gabapentin dose today, advised patient to call neurosurgery for sooner appointment if LEE, increase gabapentin, physical therapy do not improve pain.    - Increase to gabapentin 300 mg TID

## 2025-02-19 ENCOUNTER — HOSPITAL ENCOUNTER (OUTPATIENT)
Age: 60
Setting detail: THERAPIES SERIES
Discharge: HOME | End: 2025-02-19
Payer: COMMERCIAL

## 2025-02-19 PROCEDURE — 97162 PT EVAL MOD COMPLEX 30 MIN: CPT

## 2025-02-19 PROCEDURE — 97110 THERAPEUTIC EXERCISES: CPT

## 2025-02-19 NOTE — CONSULTS
Tests ? Pain ? Pain No Change Not Tested   RFIS [] [] [] [x]   LYRIC [] [] [] [x]   RFIL [] [] [] [x]   REIL [] [] [] [x]   Rep Prot [] [] [] [x]   Rep Retract [] [] [] [x]   *Piriformis, fabers, hip scour no increase in pain    Hooklying-no change 4/10, pain no longer in leg  DKTC- w/belt severe pain w/attempting   Prone-no change, 4/10, pressure R buttock, LB, no pain in LE  YANCY-no change 4/10, begins to go down R LE    Pt c/o pain w/bed mobility, including rolling, sit to supine, and prone to sit    OBSERVATION No Deficit Deficit Not Tested Comments   Posture       Forward Head [] [x] []    Rounded Shoulders [] [x] []    Kyphosis [x] [] []    Lordosis [x] [] []    Lateral Shift [x] [] []    Scoliosis [x] [] []    Iliac Crest [x] [] []    Slumped Sitting [] [x] []    Palpation [] [x] [] Tender R LB, L5, S1   Sensation [] [] [x]    Edema [] [] [x]    Neurological [] [] [x]    In standing Pt stays in forward flex and L lat flexion, only takes a few steps between chair and mat table, reaches for UE support.      Functional Test: Oswestry  Score: Raw score of 39/50,   which is 78% functionally impaired     Comments:    Assessment:    Pt presents w/LBP radiating into R LE that suddenly came on approx 3 months ago, unknown cause. Pain is limiting all his daily activities and functional mobility, and is preventing him for driving and from working.    Patient would benefit from skilled physical therapy services in order to: decrease pain LB, R LE, increase ROM lumbar ext, increase strength R LE, L hip, spinal stab muscles, and improve sleeping, ADLs, bed mobility, standing, walking, and household activities, and prepare Pt for return to work.    Problem list, as detailed above:   [x] ? Back Pain:    [] ? Cervical Pain:    [x] ? ROM:     [x] ? Strength:   [x] ? Function:  [x] Postural Deviations  [] Gait Deviations  [] Other:     STG: (to be met in 10 treatments)  ? Pain: LB, R LE 7/10 average pain   ? ROM: Lumbar Ext 10°

## 2025-02-20 ENCOUNTER — TELEPHONE (OUTPATIENT)
Dept: INTERNAL MEDICINE | Age: 60
End: 2025-02-20

## 2025-02-20 NOTE — TELEPHONE ENCOUNTER
Patient is stating that Physical Therapy stated he is not able to return to work on 2-. Patient needs [employer] paperwork extended with Medical Diagnosis and Limitations as to why he can not return on 2-.    Please Advise.

## 2025-02-24 ENCOUNTER — HOSPITAL ENCOUNTER (OUTPATIENT)
Age: 60
Setting detail: THERAPIES SERIES
Discharge: HOME | End: 2025-02-24
Payer: COMMERCIAL

## 2025-02-24 NOTE — FLOWSHEET NOTE
[x] [facility]  Outpatient Rehabilitation &  Therapy  [address]  [phone]  [phone]     Therapy Cancel/No Show note    Date: 2025  Patient: Yo Clark  : 1965  MRN: [id number]    Cancels/No Shows to date:     For today's appointment patient:    [x]  Cancelled    [] Rescheduled appointment    [] No-show     Reason given by patient:    []  Patient ill    []  Conflicting appointment    [] No transportation      [] Conflict with work    [] No reason given    [] Weather related    [] COVID-19    [x] Other:      Comments:  pt father was in an accident       [x] Next appointment was confirmed, previously    Electronically signed by: Paige Krabbenbos, SPTA Patient treated and note written by Paige Krabbenbos, Student Physical Therapist Assistant under supervision of Lorene Cruz PTA.

## 2025-02-26 NOTE — TELEPHONE ENCOUNTER
Writer spoke with PCP, completed updated [facility] paperwork and faxed to [employer] See media.

## 2025-02-27 ENCOUNTER — HOSPITAL ENCOUNTER (OUTPATIENT)
Age: 60
Setting detail: THERAPIES SERIES
Discharge: HOME | End: 2025-02-27
Payer: COMMERCIAL

## 2025-02-27 PROCEDURE — 97110 THERAPEUTIC EXERCISES: CPT

## 2025-02-27 PROCEDURE — 97140 MANUAL THERAPY 1/> REGIONS: CPT

## 2025-02-28 NOTE — TELEPHONE ENCOUNTER
Patient placed call office stating [employer] did not receive FMLA paperwork. Patient provided another fax number [phone]. Writer faxed awaiting confirmation

## 2025-03-04 ENCOUNTER — HOSPITAL ENCOUNTER (OUTPATIENT)
Age: 60
Setting detail: THERAPIES SERIES
Discharge: HOME | End: 2025-03-04
Payer: COMMERCIAL

## 2025-03-04 PROCEDURE — 97110 THERAPEUTIC EXERCISES: CPT

## 2025-03-04 PROCEDURE — G0283 ELEC STIM OTHER THAN WOUND: HCPCS

## 2025-03-04 PROCEDURE — 97140 MANUAL THERAPY 1/> REGIONS: CPT

## 2025-03-04 NOTE — FLOWSHEET NOTE
[x] [facility]  Outpatient Rehabilitation &  Therapy  [address]  [phone]  [phone] [] [facility]  Outpatient Rehabilitation &  Therapy  [address]  P: [phone]  F: [phone] [] [facility]  Outpatient Rehabilitation &  Therapy  [address]  P: [phone]  F: [phone] [] [facility]  Outpatient Rehabilitation &  Therapy  [address]  [phone]  [phone] [] [facility]  Outpatient Rehabilitation &  Therapy  [address]   P: [phone]  F: [phone]  [] [facility]  Outpatient Rehabilitation &  Therapy  [address]  P: [phone]  F: [phone] [] [facility]  Outpatient Rehabilitation &  Therapy  [address]  P: [phone]  F: [phone] [] [facility]  Outpatient Rehabilitation &  Therapy  [address]  P: [phone]  F: [phone] [] [facility]  Outpatient Rehabilitation &  Therapy  [address]  P: [phone]  F: [phone]  [] [facility] Outpatient Rehabilitation &  Therapy  [address]  P: [phone]  F: [phone]     Physical Therapy Daily Treatment Note    Date:  3/4/2025  Patient Name:  Yo Clark    :  1965  MRN: [id number]  Physician: Brynn Braun, AMALIA - TANVI                             Insurance: [employer] ANTH[employer] PPO (PT IS BASED ON MEDICAL NECESSITY PER LEIGHA YR )  Medical Diagnosis: Spinal stenosis of lumbar region with radiculopathy (M48.061,M54.16)       Rehab Codes: M54.59, M25.551, M79.651, R26.89, R26.81, R29.3, M62.551, M62.552, M62.561  Onset Date: 2024                      Next 's appt.: 3/12 PCP, 3/18 Pn Mgmt,  Neuro  Visit# / total visits: 3/18     Cancels/No Shows: 1/0    Subjective:    Pain:  [x] Yes  [] No  Location:

## 2025-03-06 ENCOUNTER — HOSPITAL ENCOUNTER (OUTPATIENT)
Age: 60
Setting detail: THERAPIES SERIES
Discharge: HOME | End: 2025-03-06
Payer: COMMERCIAL

## 2025-03-06 NOTE — FLOWSHEET NOTE
[x] [facility]  Outpatient Rehabilitation &  Therapy  [address]  [phone]  [phone]     Therapy Cancel/No Show note    Date: 3/6/2025  Patient: Yo Clark  : 1965  MRN: [id number]    Cancels/No Shows to date: 20    For today's appointment patient:    [x]  Cancelled    [] Rescheduled appointment    [] No-show     Reason given by patient:    []  Patient ill    []  Conflicting appointment    [x] No transportation      [] Conflict with work    [] No reason given    [] Weather related    [] COVID-19    [] Other:      Comments:        [x] Next appointment was confirmed    Electronically signed by: Lorene Cruz PTA

## 2025-03-13 ENCOUNTER — HOSPITAL ENCOUNTER (OUTPATIENT)
Age: 60
Setting detail: THERAPIES SERIES
Discharge: HOME | End: 2025-03-13
Payer: COMMERCIAL

## 2025-03-13 NOTE — FLOWSHEET NOTE
[x] [facility]  Outpatient Rehabilitation &  Therapy  [address]  [phone]  [phone]     Therapy Cancel/No Show note    Date: 3/13/2025  Patient: Yo Clark  : 1965  MRN: [id number]    Cancels/No Shows to date:     For today's appointment patient:    []  Cancelled    [] Rescheduled appointment    [x] No-show     Reason given by patient:    []  Patient ill    []  Conflicting appointment    [] No transportation      [] Conflict with work    [] No reason given    [] Weather related    [] COVID-19    [x] Other:      Comments:  Patient to be discharged due to attendance      [] Next appointment was confirmed    Electronically signed by: Lorene Cruz PTA

## 2025-03-13 NOTE — THERAPY DISCHARGE
[x] [facility]  Outpatient Rehabilitation &  Therapy  [address]  [phone]  [phone] [] [facility]  Outpatient Rehabilitation &  Therapy  [address]  P: [phone]  F: [phone] [] [facility]  Outpatient Rehabilitation &  Therapy  [address]  P: [phone]  F: [phone] [] [facility]  Outpatient Rehabilitation &  Therapy  [address]  [phone]  [phone] [] [facility]  Outpatient Rehabilitation &  Therapy  [address]   P: [phone]  F: [phone]  [] [facility]  Outpatient Rehabilitation &  Therapy  [address]  P: [phone]  F: [phone] [] [facility]  Outpatient Rehabilitation &  Therapy  [address]  P: [phone]  F: [phone] [] [facility]  Outpatient Rehabilitation &  Therapy  [address]  P: [phone]  F: [phone] [] [facility]  Outpatient Rehabilitation &  Therapy  [address]  P: [phone]  F: [phone]  [] [facility] Outpatient Rehabilitation &  Therapy  [address]  P: [phone]  F: [phone]        Physical Therapy Discharge Note    Date: 3/13/2025      Patient: Yo Clark  : 1965  MRN: [id number]    Patient: Yo Clark                    : 1965                        MRN: [id number]  Physician: Brynn Braun, APRN - CNP                             Insurance: [employer] ANTH PPO (PT IS BASED ON MEDICAL NECESSITY PER LEIGHA YR )  Medical Diagnosis: Spinal stenosis of lumbar region with radiculopathy (M48.061,M54.16)       Rehab Codes: M54.59, M25.551, M79.651, R26.89, R26.81, R29.3, M62.551, M62.552, M62.561  Onset Date: 2024                      Next 's appt.: 3/12 PCP, 3/18 Pn Mgmt,  Neuro  Total visits

## 2025-03-23 DIAGNOSIS — M54.31 SCIATIC NERVE PAIN, RIGHT: ICD-10-CM

## 2025-03-24 NOTE — TELEPHONE ENCOUNTER
Yo Clark is calling to request a refill on the following medication(s):    Medication Request:  Requested Prescriptions     Pending Prescriptions Disp Refills    ibuprofen (ADVIL;MOTRIN) 800 MG tablet [Pharmacy Med Name: IBUPROFEN 800 MG TABLET] 60 tablet 0     Sig: TAKE 1 TABLET BY MOUTH 2 TIMES A DAY AS NEEDED FOR PAIN, TAKE WITH GABAPENTIN WITH BREAKFAST AND AT DINNER       Last Visit Date (If Applicable):  2/12/2025    Next Visit Date:    3/26/2025

## 2025-03-25 RX ORDER — IBUPROFEN 800 MG/1
TABLET, FILM COATED ORAL
Qty: 60 TABLET | Refills: 2 | Status: SHIPPED | OUTPATIENT
Start: 2025-03-25

## 2025-03-26 ENCOUNTER — OFFICE VISIT (OUTPATIENT)
Dept: INTERNAL MEDICINE | Age: 60
End: 2025-03-26
Payer: COMMERCIAL

## 2025-03-26 VITALS
OXYGEN SATURATION: 99 % | TEMPERATURE: 97.7 F | HEART RATE: 62 BPM | HEIGHT: 69 IN | WEIGHT: 179.6 LBS | SYSTOLIC BLOOD PRESSURE: 142 MMHG | DIASTOLIC BLOOD PRESSURE: 90 MMHG | BODY MASS INDEX: 26.6 KG/M2

## 2025-03-26 DIAGNOSIS — M48.062 SPINAL STENOSIS OF LUMBAR REGION WITH NEUROGENIC CLAUDICATION: Primary | ICD-10-CM

## 2025-03-26 DIAGNOSIS — I10 PRIMARY HYPERTENSION: ICD-10-CM

## 2025-03-26 PROCEDURE — 3080F DIAST BP >= 90 MM HG: CPT

## 2025-03-26 PROCEDURE — 99213 OFFICE O/P EST LOW 20 MIN: CPT

## 2025-03-26 PROCEDURE — 3077F SYST BP >= 140 MM HG: CPT

## 2025-03-26 SDOH — ECONOMIC STABILITY: FOOD INSECURITY: WITHIN THE PAST 12 MONTHS, YOU WORRIED THAT YOUR FOOD WOULD RUN OUT BEFORE YOU GOT MONEY TO BUY MORE.: NEVER TRUE

## 2025-03-26 SDOH — ECONOMIC STABILITY: FOOD INSECURITY: WITHIN THE PAST 12 MONTHS, THE FOOD YOU BOUGHT JUST DIDN'T LAST AND YOU DIDN'T HAVE MONEY TO GET MORE.: NEVER TRUE

## 2025-03-26 NOTE — ASSESSMENT & PLAN NOTE
1/29: Patient has chronic right lower extremity sciatic nerve pain.  Has been in acute exacerbation since early January 2025.  Had ED visit and was prescribed steroids and gabapentin 100 mg BID which was not effective in resolving his pain.  Patient has been to physical therapy before and endorses still doing the recommended exercises daily.  Reviewed exercises with patient and advised him to continue the regimen.  Has had no relief with Tylenol, ibuprofen at home.  Patient had prior MRI lumbar spine in 2021 which showed degenerative disc disease and multilevel degenerative facet hypertrophy with a superimposed midline disc extrusion at L3-4, canal stenosis at L3-4 and L4-5, and severe narrowing of the L3-4 lateral recesses.  Will order repeat MRI lumbar spine and neurosurgery referral, this was discussed with patient he is amenable.  Will trial multimodal pain control until patient can be seen by neurosurgery.      2/12: MRI showed worsening of stenosis.  Patient was seen by neurosurgery, recommended epidural steroid injection, physical therapy, close follow-up.  Patient reports worsening of the pain since last visit.  States he has been compliant with medications.  Has LEE and physical therapy follow-up scheduled.  Will plan to go up on gabapentin dose today, advised patient to call neurosurgery for sooner appointment if LEE, increase gabapentin, physical therapy do not improve pain.    3/26/25: Patient is noncompliant with physical therapy.  States she has right lower extremity pain is significantly improved from prior.  Patient ambulating well, however slowly.  States pain 6/10 intensity today.  Down from 10/10 prior visits.  Patient has appointment with pain management clinic to consider LEE recommended by neurosurgery at last visit.  Continues on gabapentin, ibuprofen for symptomatic control.  Patient feels well enough to return to work, requesting work clearance letter today.    - Continue current regimen of

## 2025-03-26 NOTE — ASSESSMENT & PLAN NOTE
2/12/25: /80 today, well-controlled.  Patient is compliant with his medications.      3/26/25: /92 in clinic today, repeat 142/90.  Patient does have right lower extremity pain.  Patient also to see did not take his medications today.  Would not like to increase any antihypertensive regimen at this time.  Advised patient to check BP at home several times per week at the same time of day and keep log, and to report any significantly elevated BP >160 to clinic.    - Continue amlodipine 5 mg daily, lisinopril-HCTZ 20-25 mg daily

## 2025-03-26 NOTE — ASSESSMENT & PLAN NOTE
1/29: Patient has chronic right lower extremity sciatic nerve pain.  Has been in acute exacerbation since early January 2025.  Had ED visit and was prescribed steroids and gabapentin 100 mg BID which was not effective in resolving his pain.  Patient has been to physical therapy before and endorses still doing the recommended exercises daily.  Reviewed exercises with patient and advised him to continue the regimen.  Has had no relief with Tylenol, ibuprofen at home.  Patient had prior MRI lumbar spine in 2021 which showed degenerative disc disease and multilevel degenerative facet hypertrophy with a superimposed midline disc extrusion at L3-4, canal stenosis at L3-4 and L4-5, and severe narrowing of the L3-4 lateral recesses.  Will order repeat MRI lumbar spine and neurosurgery referral, this was discussed with patient he is amenable.  Will trial multimodal pain control until patient can be seen by neurosurgery.      2/12: MRI showed worsening of stenosis.  Patient was seen by neurosurgery, recommended epidural steroid injection, physical therapy, close follow-up.  Patient reports worsening of the pain since last visit.  States he has been compliant with medications.  Has LEE and physical therapy follow-up scheduled.  Will plan to go up on gabapentin dose today, advised patient to call neurosurgery for sooner appointment if LEE, increase gabapentin, physical therapy do not improve pain.     3/26/25: Patient has been compliant with physical therapy.  States his right lower extremity pain is significantly improved from prior.  Patient ambulating well, however slowly.  States pain 6/10 intensity today.  Down from 10/10 prior visits.  Patient has appointment with pain management clinic to consider LEE recommended by neurosurgery at last visit.  Continues on gabapentin, ibuprofen for symptomatic control.  Patient feels well enough to return to work, requesting work clearance letter today.     - Continue current regimen of

## 2025-03-26 NOTE — PROGRESS NOTES
[facility]  [facility]  [address] [place] [address]  Dept: [phone]  Dept Fax: [phone]    Office Progress/Follow Up Note  Date of patient's visit: 3/26/2025  Patient's Name:  Yo Clark YOB: 1965            Patient Care Team:  Yo Pavon MD as PCP - General (Internal Medicine)  Love Preston MD as PCP - Empaneled Provider    REASON FOR VISIT: Routine outpatient follow up    HISTORY OF PRESENT ILLNESS:      Chief Complaint   Patient presents with    Back Pain     2 month follow up       History was obtained from the patient. Yo Clark is a 59 y.o. is here for a follow up visit. Patient reports significant improvement in his right lower extremity pain since last visit.  Rates pain 6/10 intensity today.  Patient has improved mobility compared to last visit as well.  Patient would like to return to work, requesting letter clearing him to return to work.  /92 in clinic today, repeat 142/90.  Last BP in clinic was 130/80.  Patient states he has not taken his medications today, usually takes around 10 AM.  Also reports pain, possibly increasing his blood pressure.  Would not like to increase any of his antihypertensive medications.  Will defer at this time, follow-up at next visit.  No other acute complaints today.  No refills needed.  Denies any recent fever, chills, chest pain, abdominal pain, nausea, vomiting, dizziness, lightheadedness, headache, leg swelling.    Pertinent screening:  Colon cancer: colonoscopy  w/ tubular adenoma removed, follow-up in 5-7 years  Lung cancer screening: Denies low-dose CT screening  Depression screening: PHQ-2 negative     Vaccinations: Denies any vaccinations today     Life-style:  Smokin pack-year history  Alcohol: Social  Diet and Exercise: Advised diabetic diet and exercise regimen      Patient Active Problem List   Diagnosis    Neuropathy    Hypertension    Tobacco use disorder

## 2025-04-02 NOTE — PROGRESS NOTES
Attending Physician Statement  I have discussed the care of Yo Clark including pertinent history and exam findings, with the resident. I have reviewed the key elements of all parts of the encounter with the resident.  I agree with the assessment, plan and orders as documented by the resident.  (GE Modifier)    MD JOHN Aguillon  Attending Physician, [facility]   Faculty, Internal Medicine Residency Program  [facility] Physician [facility]  4/2/2025, 1:22 PM

## 2025-04-02 NOTE — PROGRESS NOTES
Attending Physician Statement  I have discussed the care of Yo Clark including pertinent history and exam findings, with the resident. I have reviewed the key elements of all parts of the encounter with the resident.  I agree with the assessment, plan and orders as documented by the resident.  (GE Modifier)    MD JOHN Aguillon  Attending Physician, [facility]   Faculty, Internal Medicine Residency Program  [facility] Physician [facility]  4/2/2025, 1:22 PM

## 2025-04-08 ENCOUNTER — INITIAL CONSULT (OUTPATIENT)
Dept: PAIN MANAGEMENT | Age: 60
End: 2025-04-08
Payer: COMMERCIAL

## 2025-04-08 VITALS — BODY MASS INDEX: 26.51 KG/M2 | HEIGHT: 69 IN | WEIGHT: 179 LBS

## 2025-04-08 DIAGNOSIS — M48.062 SPINAL STENOSIS OF LUMBAR REGION WITH NEUROGENIC CLAUDICATION: Primary | ICD-10-CM

## 2025-04-08 DIAGNOSIS — M54.51 VERTEBROGENIC LOW BACK PAIN: ICD-10-CM

## 2025-04-08 DIAGNOSIS — M47.817 LUMBOSACRAL SPONDYLOSIS WITHOUT MYELOPATHY: ICD-10-CM

## 2025-04-08 PROCEDURE — 99204 OFFICE O/P NEW MOD 45 MIN: CPT | Performed by: ANESTHESIOLOGY

## 2025-04-08 NOTE — PROGRESS NOTES
The patient is a 59 y.o.Non- / non  male.    Chief Complaint   Patient presents with    Consultation    Back Pain        Pain History  59-year-old man with history of chronic lower back pain going on for 5+ years  Located in the lower lumbar area  Reports intermittent radiation down right leg or hip gluteal region posterior thigh  Describes the pain as constant deep throbbing aching burning shooting sensation  Aggravates with prolonged standing lifting bending twisting turning  Nothing seems to alleviate the pain  Associated symptoms include numbness and tingling and intermittent weakness  No changes in bladder or bowel control  Had recent MRI lumbar spine  Did physical therapy without much benefit  Have tried NSAIDs and muscle relaxant and gabapentin  Pain is affecting quality of life and activity level  No previous lumbar spine interventional procedures surgical history  Pain score today: 8/10, today   1. Location: Lumbar Spine    2. Radiation: RT leg to knee  3. Character: shooting, stabbing, aching, throbbing   5. Duration: all day   6. Onset: 5+ yrs  7. Did an injury cause pain: pulling a rack and strained something  8. Aggravating factors: standing for long periods  9. Alleviating factors: nothing   10. Associated symptoms (numbness / tingling / weakness): numbness, tingling, weakness  -Where at: RT leg  -Down into finger tips or toes (specify which finger or toes): no  -constant or intermitting:  constant   11. Red Flags: (weight loss / chills / loss of bladder or bowel control): weight loss     Previous management history  1. Previous diagnostic workup: (Imaging/EMG)   CT, MRI, or Xray: MRI//Xray   What part of the body: lumbar spine  What facility [facility]  What year or specific date: 2/6/25 // 2/12/25  EMG:  yes     2. Previous non interventional treatments tried:  chiropractor or physical therapy: PT  What part of the body: back    What facility was it done at: [facility]  How

## 2025-04-30 ENCOUNTER — HOSPITAL ENCOUNTER (OUTPATIENT)
Dept: PAIN MANAGEMENT | Facility: CLINIC | Age: 60
Discharge: HOME | End: 2025-04-30

## 2025-04-30 NOTE — DISCHARGE INSTRUCTIONS

## 2025-04-30 NOTE — FLOWSHEET NOTE
Pt called the office to return my call to state that he notified the [facility] office this morning at 8am to state that he would not be able to make his procedure today, due to him having a family emergency. Pt will need to r/s his appt for today.

## 2025-06-25 ENCOUNTER — TELEPHONE (OUTPATIENT)
Age: 60
End: 2025-06-25

## 2025-08-08 DIAGNOSIS — I10 PRIMARY HYPERTENSION: ICD-10-CM

## 2025-08-08 RX ORDER — LISINOPRIL AND HYDROCHLOROTHIAZIDE 20; 25 MG/1; MG/1
1 TABLET ORAL DAILY
Qty: 30 TABLET | Refills: 5 | Status: SHIPPED | OUTPATIENT
Start: 2025-08-08

## (undated) DEVICE — 4-PORT MANIFOLD: Brand: NEPTUNE 2

## (undated) DEVICE — SNARE ENDOSCP L240CM SHTH DIA2.4MM LOOP W20MM MIN WRK CHN

## (undated) DEVICE — SOLUTION IV IRRIG 1000ML POUR BTL 2F7114

## (undated) DEVICE — FORCEPS BX L240CM JAW DIA2.4MM ORNG L CAP W/ NDL DISP RAD

## (undated) DEVICE — TUBING INSUFFLATION CAP W/ EXT CARBON DIOX ENDO SMARTCAP

## (undated) DEVICE — PERRYSBURG ENDO PACK: Brand: MEDLINE INDUSTRIES, INC.

## (undated) DEVICE — CONNECTOR TBNG AUX H2O JET DISP FOR OLY 160/180 SER

## (undated) DEVICE — Z DISCONTINUED USE 2751540 TUBING IRRIG L10IN DISP PMP ENDOGATOR

## (undated) DEVICE — TUBING, SUCTION, 3/16" X 10', STRAIGHT: Brand: MEDLINE